# Patient Record
Sex: MALE | Race: WHITE | NOT HISPANIC OR LATINO | Employment: OTHER | URBAN - METROPOLITAN AREA
[De-identification: names, ages, dates, MRNs, and addresses within clinical notes are randomized per-mention and may not be internally consistent; named-entity substitution may affect disease eponyms.]

---

## 2021-06-30 ENCOUNTER — HOSPITAL ENCOUNTER (INPATIENT)
Facility: MEDICAL CENTER | Age: 66
LOS: 3 days | DRG: 552 | End: 2021-07-03
Attending: EMERGENCY MEDICINE | Admitting: STUDENT IN AN ORGANIZED HEALTH CARE EDUCATION/TRAINING PROGRAM
Payer: COMMERCIAL

## 2021-06-30 DIAGNOSIS — S00.31XA ABRASION OF NOSE, INITIAL ENCOUNTER: ICD-10-CM

## 2021-06-30 DIAGNOSIS — I95.1 ORTHOSTATIC SYNCOPE: ICD-10-CM

## 2021-06-30 DIAGNOSIS — S12.401A CLOSED NONDISPLACED FRACTURE OF FIFTH CERVICAL VERTEBRA, UNSPECIFIED FRACTURE MORPHOLOGY, INITIAL ENCOUNTER (HCC): ICD-10-CM

## 2021-06-30 LAB
ANION GAP SERPL CALC-SCNC: 11 MMOL/L (ref 7–16)
BUN SERPL-MCNC: 25 MG/DL (ref 8–22)
CALCIUM SERPL-MCNC: 9 MG/DL (ref 8.5–10.5)
CHLORIDE SERPL-SCNC: 106 MMOL/L (ref 96–112)
CO2 SERPL-SCNC: 24 MMOL/L (ref 20–33)
CREAT SERPL-MCNC: 0.76 MG/DL (ref 0.5–1.4)
GLUCOSE SERPL-MCNC: 148 MG/DL (ref 65–99)
POTASSIUM SERPL-SCNC: 4.4 MMOL/L (ref 3.6–5.5)
SODIUM SERPL-SCNC: 141 MMOL/L (ref 135–145)

## 2021-06-30 PROCEDURE — 86901 BLOOD TYPING SEROLOGIC RH(D): CPT

## 2021-06-30 PROCEDURE — 80048 BASIC METABOLIC PNL TOTAL CA: CPT

## 2021-06-30 PROCEDURE — 85025 COMPLETE CBC W/AUTO DIFF WBC: CPT

## 2021-06-30 PROCEDURE — 770006 HCHG ROOM/CARE - MED/SURG/GYN SEMI*

## 2021-06-30 PROCEDURE — 99285 EMERGENCY DEPT VISIT HI MDM: CPT

## 2021-06-30 PROCEDURE — 86900 BLOOD TYPING SEROLOGIC ABO: CPT

## 2021-06-30 PROCEDURE — 86850 RBC ANTIBODY SCREEN: CPT

## 2021-06-30 PROCEDURE — 99223 1ST HOSP IP/OBS HIGH 75: CPT | Performed by: STUDENT IN AN ORGANIZED HEALTH CARE EDUCATION/TRAINING PROGRAM

## 2021-06-30 RX ORDER — OMEPRAZOLE 10 MG/1
10 CAPSULE, DELAYED RELEASE ORAL DAILY
Status: DISCONTINUED | OUTPATIENT
Start: 2021-07-01 | End: 2021-07-01

## 2021-06-30 RX ORDER — MORPHINE SULFATE 4 MG/ML
2 INJECTION, SOLUTION INTRAMUSCULAR; INTRAVENOUS
Status: DISCONTINUED | OUTPATIENT
Start: 2021-06-30 | End: 2021-06-30

## 2021-06-30 RX ORDER — ONDANSETRON 4 MG/1
4 TABLET, ORALLY DISINTEGRATING ORAL EVERY 4 HOURS PRN
Status: DISCONTINUED | OUTPATIENT
Start: 2021-06-30 | End: 2021-07-03 | Stop reason: HOSPADM

## 2021-06-30 RX ORDER — ASPIRIN 81 MG/1
81 TABLET, CHEWABLE ORAL DAILY
Status: DISCONTINUED | OUTPATIENT
Start: 2021-07-01 | End: 2021-07-03 | Stop reason: HOSPADM

## 2021-06-30 RX ORDER — BACLOFEN 10 MG/1
10 TABLET ORAL 3 TIMES DAILY
Status: DISCONTINUED | OUTPATIENT
Start: 2021-07-01 | End: 2021-07-03 | Stop reason: HOSPADM

## 2021-06-30 RX ORDER — NADOLOL 20 MG/1
20 TABLET ORAL 2 TIMES DAILY
Status: DISCONTINUED | OUTPATIENT
Start: 2021-07-01 | End: 2021-07-03 | Stop reason: HOSPADM

## 2021-06-30 RX ORDER — PROMETHAZINE HYDROCHLORIDE 25 MG/1
12.5-25 TABLET ORAL EVERY 4 HOURS PRN
Status: DISCONTINUED | OUTPATIENT
Start: 2021-06-30 | End: 2021-07-03 | Stop reason: HOSPADM

## 2021-06-30 RX ORDER — ONDANSETRON 2 MG/ML
4 INJECTION INTRAMUSCULAR; INTRAVENOUS EVERY 4 HOURS PRN
Status: DISCONTINUED | OUTPATIENT
Start: 2021-06-30 | End: 2021-07-03 | Stop reason: HOSPADM

## 2021-06-30 RX ORDER — PROCHLORPERAZINE EDISYLATE 5 MG/ML
5-10 INJECTION INTRAMUSCULAR; INTRAVENOUS EVERY 4 HOURS PRN
Status: DISCONTINUED | OUTPATIENT
Start: 2021-06-30 | End: 2021-07-03 | Stop reason: HOSPADM

## 2021-06-30 RX ORDER — CLONIDINE HYDROCHLORIDE 0.1 MG/1
0.1 TABLET ORAL EVERY 6 HOURS PRN
Status: DISCONTINUED | OUTPATIENT
Start: 2021-06-30 | End: 2021-07-03 | Stop reason: HOSPADM

## 2021-06-30 RX ORDER — ENALAPRILAT 1.25 MG/ML
1.25 INJECTION INTRAVENOUS EVERY 6 HOURS PRN
Status: DISCONTINUED | OUTPATIENT
Start: 2021-06-30 | End: 2021-07-03 | Stop reason: HOSPADM

## 2021-06-30 RX ORDER — HEPARIN SODIUM 5000 [USP'U]/ML
5000 INJECTION, SOLUTION INTRAVENOUS; SUBCUTANEOUS EVERY 8 HOURS
Status: DISCONTINUED | OUTPATIENT
Start: 2021-07-01 | End: 2021-07-01

## 2021-06-30 RX ORDER — BISACODYL 10 MG
10 SUPPOSITORY, RECTAL RECTAL
Status: DISCONTINUED | OUTPATIENT
Start: 2021-06-30 | End: 2021-07-03 | Stop reason: HOSPADM

## 2021-06-30 RX ORDER — PROMETHAZINE HYDROCHLORIDE 25 MG/1
12.5-25 SUPPOSITORY RECTAL EVERY 4 HOURS PRN
Status: DISCONTINUED | OUTPATIENT
Start: 2021-06-30 | End: 2021-07-03 | Stop reason: HOSPADM

## 2021-06-30 RX ORDER — POLYETHYLENE GLYCOL 3350 17 G/17G
1 POWDER, FOR SOLUTION ORAL
Status: DISCONTINUED | OUTPATIENT
Start: 2021-06-30 | End: 2021-07-03 | Stop reason: HOSPADM

## 2021-06-30 RX ORDER — DEXTROSE MONOHYDRATE 25 G/50ML
50 INJECTION, SOLUTION INTRAVENOUS
Status: DISCONTINUED | OUTPATIENT
Start: 2021-06-30 | End: 2021-07-03 | Stop reason: HOSPADM

## 2021-06-30 RX ORDER — AMOXICILLIN 250 MG
2 CAPSULE ORAL 2 TIMES DAILY
Status: DISCONTINUED | OUTPATIENT
Start: 2021-07-01 | End: 2021-07-03 | Stop reason: HOSPADM

## 2021-06-30 RX ORDER — LABETALOL HYDROCHLORIDE 5 MG/ML
10 INJECTION, SOLUTION INTRAVENOUS EVERY 4 HOURS PRN
Status: DISCONTINUED | OUTPATIENT
Start: 2021-06-30 | End: 2021-07-03 | Stop reason: HOSPADM

## 2021-06-30 RX ORDER — LEVOTHYROXINE SODIUM 0.1 MG/1
100 TABLET ORAL DAILY
Status: DISCONTINUED | OUTPATIENT
Start: 2021-07-01 | End: 2021-07-03 | Stop reason: HOSPADM

## 2021-06-30 RX ORDER — LISINOPRIL 20 MG/1
40 TABLET ORAL DAILY
Status: DISCONTINUED | OUTPATIENT
Start: 2021-07-01 | End: 2021-07-03 | Stop reason: HOSPADM

## 2021-06-30 RX ORDER — ACETAMINOPHEN 325 MG/1
650 TABLET ORAL EVERY 6 HOURS PRN
Status: DISCONTINUED | OUTPATIENT
Start: 2021-06-30 | End: 2021-07-03 | Stop reason: HOSPADM

## 2021-06-30 RX ORDER — OXYCODONE HYDROCHLORIDE 5 MG/1
5 TABLET ORAL
Status: DISCONTINUED | OUTPATIENT
Start: 2021-06-30 | End: 2021-07-03 | Stop reason: HOSPADM

## 2021-06-30 RX ORDER — ATORVASTATIN CALCIUM 10 MG/1
10 TABLET, FILM COATED ORAL DAILY
Status: DISCONTINUED | OUTPATIENT
Start: 2021-07-01 | End: 2021-07-01

## 2021-06-30 RX ORDER — OXYCODONE HYDROCHLORIDE 5 MG/1
2.5 TABLET ORAL
Status: DISCONTINUED | OUTPATIENT
Start: 2021-06-30 | End: 2021-07-03 | Stop reason: HOSPADM

## 2021-07-01 ENCOUNTER — HOSPITAL ENCOUNTER (OUTPATIENT)
Dept: RADIOLOGY | Facility: MEDICAL CENTER | Age: 66
End: 2021-07-01
Payer: COMMERCIAL

## 2021-07-01 ENCOUNTER — APPOINTMENT (OUTPATIENT)
Dept: RADIOLOGY | Facility: MEDICAL CENTER | Age: 66
DRG: 552 | End: 2021-07-01
Attending: STUDENT IN AN ORGANIZED HEALTH CARE EDUCATION/TRAINING PROGRAM
Payer: COMMERCIAL

## 2021-07-01 PROBLEM — Z79.4 TYPE 2 DIABETES MELLITUS WITHOUT COMPLICATION, WITH LONG-TERM CURRENT USE OF INSULIN (HCC): Status: ACTIVE | Noted: 2021-07-01

## 2021-07-01 PROBLEM — S12.400A CLOSED FRACTURE OF FIFTH CERVICAL VERTEBRA (HCC): Status: ACTIVE | Noted: 2021-07-01

## 2021-07-01 PROBLEM — E03.8 OTHER SPECIFIED HYPOTHYROIDISM: Status: ACTIVE | Noted: 2021-07-01

## 2021-07-01 PROBLEM — G35 RELAPSING REMITTING MULTIPLE SCLEROSIS (HCC): Status: ACTIVE | Noted: 2021-07-01

## 2021-07-01 PROBLEM — E11.9 TYPE 2 DIABETES MELLITUS WITHOUT COMPLICATION, WITH LONG-TERM CURRENT USE OF INSULIN (HCC): Status: ACTIVE | Noted: 2021-07-01

## 2021-07-01 LAB
ABO + RH BLD: NORMAL
ABO GROUP BLD: NORMAL
BASOPHILS # BLD AUTO: 0.4 % (ref 0–1.8)
BASOPHILS # BLD: 0.03 K/UL (ref 0–0.12)
BLD GP AB SCN SERPL QL: NORMAL
EOSINOPHIL # BLD AUTO: 0.11 K/UL (ref 0–0.51)
EOSINOPHIL NFR BLD: 1.5 % (ref 0–6.9)
ERYTHROCYTE [DISTWIDTH] IN BLOOD BY AUTOMATED COUNT: 44.5 FL (ref 35.9–50)
EST. AVERAGE GLUCOSE BLD GHB EST-MCNC: 146 MG/DL
GLUCOSE BLD-MCNC: 122 MG/DL (ref 65–99)
GLUCOSE BLD-MCNC: 131 MG/DL (ref 65–99)
GLUCOSE BLD-MCNC: 132 MG/DL (ref 65–99)
GLUCOSE BLD-MCNC: 145 MG/DL (ref 65–99)
GLUCOSE BLD-MCNC: 149 MG/DL (ref 65–99)
HBA1C MFR BLD: 6.7 % (ref 4–5.6)
HCT VFR BLD AUTO: 43.7 % (ref 42–52)
HGB BLD-MCNC: 14.6 G/DL (ref 14–18)
IMM GRANULOCYTES # BLD AUTO: 0.02 K/UL (ref 0–0.11)
IMM GRANULOCYTES NFR BLD AUTO: 0.3 % (ref 0–0.9)
LYMPHOCYTES # BLD AUTO: 1.73 K/UL (ref 1–4.8)
LYMPHOCYTES NFR BLD: 23.3 % (ref 22–41)
MAGNESIUM SERPL-MCNC: 2.1 MG/DL (ref 1.5–2.5)
MCH RBC QN AUTO: 29.2 PG (ref 27–33)
MCHC RBC AUTO-ENTMCNC: 33.4 G/DL (ref 33.7–35.3)
MCV RBC AUTO: 87.4 FL (ref 81.4–97.8)
MONOCYTES # BLD AUTO: 0.7 K/UL (ref 0–0.85)
MONOCYTES NFR BLD AUTO: 9.4 % (ref 0–13.4)
NEUTROPHILS # BLD AUTO: 4.84 K/UL (ref 1.82–7.42)
NEUTROPHILS NFR BLD: 65.1 % (ref 44–72)
NRBC # BLD AUTO: 0 K/UL
NRBC BLD-RTO: 0 /100 WBC
PLATELET # BLD AUTO: 140 K/UL (ref 164–446)
PMV BLD AUTO: 11 FL (ref 9–12.9)
RBC # BLD AUTO: 5 M/UL (ref 4.7–6.1)
RH BLD: NORMAL
WBC # BLD AUTO: 7.4 K/UL (ref 4.8–10.8)

## 2021-07-01 PROCEDURE — 770006 HCHG ROOM/CARE - MED/SURG/GYN SEMI*

## 2021-07-01 PROCEDURE — 700102 HCHG RX REV CODE 250 W/ 637 OVERRIDE(OP): Performed by: STUDENT IN AN ORGANIZED HEALTH CARE EDUCATION/TRAINING PROGRAM

## 2021-07-01 PROCEDURE — 700117 HCHG RX CONTRAST REV CODE 255: Performed by: STUDENT IN AN ORGANIZED HEALTH CARE EDUCATION/TRAINING PROGRAM

## 2021-07-01 PROCEDURE — 83036 HEMOGLOBIN GLYCOSYLATED A1C: CPT

## 2021-07-01 PROCEDURE — 36415 COLL VENOUS BLD VENIPUNCTURE: CPT

## 2021-07-01 PROCEDURE — 99233 SBSQ HOSP IP/OBS HIGH 50: CPT | Performed by: HOSPITALIST

## 2021-07-01 PROCEDURE — A9576 INJ PROHANCE MULTIPACK: HCPCS | Performed by: STUDENT IN AN ORGANIZED HEALTH CARE EDUCATION/TRAINING PROGRAM

## 2021-07-01 PROCEDURE — 700111 HCHG RX REV CODE 636 W/ 250 OVERRIDE (IP): Performed by: STUDENT IN AN ORGANIZED HEALTH CARE EDUCATION/TRAINING PROGRAM

## 2021-07-01 PROCEDURE — 83735 ASSAY OF MAGNESIUM: CPT

## 2021-07-01 PROCEDURE — 72156 MRI NECK SPINE W/O & W/DYE: CPT

## 2021-07-01 PROCEDURE — 82962 GLUCOSE BLOOD TEST: CPT | Mod: 91

## 2021-07-01 PROCEDURE — A9270 NON-COVERED ITEM OR SERVICE: HCPCS | Performed by: STUDENT IN AN ORGANIZED HEALTH CARE EDUCATION/TRAINING PROGRAM

## 2021-07-01 RX ORDER — HEPARIN SODIUM 5000 [USP'U]/ML
5000 INJECTION, SOLUTION INTRAVENOUS; SUBCUTANEOUS EVERY 8 HOURS
Status: DISCONTINUED | OUTPATIENT
Start: 2021-07-01 | End: 2021-07-03 | Stop reason: HOSPADM

## 2021-07-01 RX ORDER — INSULIN LISPRO 100 [IU]/ML
4-18 INJECTION, SOLUTION INTRAVENOUS; SUBCUTANEOUS
COMMUNITY

## 2021-07-01 RX ADMIN — ASPIRIN 81 MG: 81 TABLET, CHEWABLE ORAL at 18:21

## 2021-07-01 RX ADMIN — HEPARIN SODIUM 5000 UNITS: 5000 INJECTION, SOLUTION INTRAVENOUS; SUBCUTANEOUS at 22:11

## 2021-07-01 RX ADMIN — BACLOFEN 10 MG: 10 TABLET ORAL at 06:37

## 2021-07-01 RX ADMIN — LEVOTHYROXINE SODIUM 100 MCG: 0.1 TABLET ORAL at 06:37

## 2021-07-01 RX ADMIN — OXYCODONE 5 MG: 5 TABLET ORAL at 01:23

## 2021-07-01 RX ADMIN — NADOLOL 20 MG: 20 TABLET ORAL at 06:37

## 2021-07-01 RX ADMIN — INSULIN GLARGINE 20 UNITS: 100 INJECTION, SOLUTION SUBCUTANEOUS at 22:13

## 2021-07-01 RX ADMIN — OXYCODONE 5 MG: 5 TABLET ORAL at 06:37

## 2021-07-01 RX ADMIN — LISINOPRIL 40 MG: 20 TABLET ORAL at 06:37

## 2021-07-01 RX ADMIN — GADOTERIDOL 19 ML: 279.3 INJECTION, SOLUTION INTRAVENOUS at 21:23

## 2021-07-01 RX ADMIN — BACLOFEN 10 MG: 10 TABLET ORAL at 18:21

## 2021-07-01 RX ADMIN — HEPARIN SODIUM 5000 UNITS: 5000 INJECTION, SOLUTION INTRAVENOUS; SUBCUTANEOUS at 14:52

## 2021-07-01 RX ADMIN — NADOLOL 20 MG: 20 TABLET ORAL at 18:21

## 2021-07-01 RX ADMIN — BACLOFEN 10 MG: 10 TABLET ORAL at 11:36

## 2021-07-01 RX ADMIN — DOCUSATE SODIUM 50 MG AND SENNOSIDES 8.6 MG 2 TABLET: 8.6; 5 TABLET, FILM COATED ORAL at 18:21

## 2021-07-01 RX ADMIN — DOCUSATE SODIUM 50 MG AND SENNOSIDES 8.6 MG 2 TABLET: 8.6; 5 TABLET, FILM COATED ORAL at 06:37

## 2021-07-01 ASSESSMENT — ENCOUNTER SYMPTOMS
SORE THROAT: 0
HEARTBURN: 0
WHEEZING: 0
LOSS OF CONSCIOUSNESS: 0
BACK PAIN: 0
DOUBLE VISION: 0
ABDOMINAL PAIN: 0
NECK PAIN: 1
SHORTNESS OF BREATH: 0
CONSTIPATION: 0
WEAKNESS: 0
VOMITING: 0
HEADACHES: 0
BLOOD IN STOOL: 0
DEPRESSION: 0
FOCAL WEAKNESS: 0
PALPITATIONS: 0
MYALGIAS: 0
FEVER: 0
CHILLS: 0
TINGLING: 1
INSOMNIA: 0
NAUSEA: 0
WEAKNESS: 1
DIZZINESS: 1
BLURRED VISION: 0
FALLS: 1
DIARRHEA: 0
BRUISES/BLEEDS EASILY: 0
COUGH: 0

## 2021-07-01 ASSESSMENT — PAIN DESCRIPTION - PAIN TYPE
TYPE: ACUTE PAIN

## 2021-07-01 NOTE — ASSESSMENT & PLAN NOTE
Continue insulin glargine 20 units at bedtime  Hemoglobin A1c ordered  Recommend daily aspirin 81 mg p.o. daily  Continue lisinopril 40 mg p.o. nightly  Low SSI  Follow-up outpatient PCP and endocrinology upon discharge

## 2021-07-01 NOTE — PROGRESS NOTES
[facility] Medicine Daily Progress Note    Date of Service  7/1/2021    Chief Complaint  66 y.o. male admitted 6/30/2021 with     Hospital Course  66 y.o. male who presented 6/30/2021 with complaints of ground-level fall earlier today while trying to put pants up in his backyard.  Patient states that he has a history of relapsing remitting multiple sclerosis and became slightly dizzy upon standing subsequently tripped and fell over face first and hit the ground.  Patient states that his arms were too weak to put out in front of him and obtained facial abrasions.  He complains of severe neck pain and states he felt completely paralyzed for the first 30 minutes however motor function had returned within the first 45 minutes after injury.  He was transferred to outside facility and subsequently transferred to [facility] for further escalation of care.  Dr. Abrams neurosurgeon has been consulted and informed patient had CT scan which revealed C5 fracture.  He is currently in neck brace and Dr. Abrams will evaluate patient in a.m and recommends hospitalist team for admission.  At time of evaluation, patient states that other than mild neck pain which he had received fentanyl for, he does not have any significant complaints.  He states he has been vaccinated x2 for COVID-19 and denies the following ROS: Anosmia, ageusia, diaphoresis, cough with sputum production, hemoptysis, nausea, vomiting, fever, chills, constipation, diarrhea, melena, hematochezia, bowel incontinence, urinary incontinence, paresthesias, tremors, seizure-like activity, loss of consciousness.     Vital signs admission were as follows: Pulse 65, respirations 11, blood pressure 172/77, SPO2 98% 2 L nasal cannula.     Outside labs were performed and CBC relatively unremarkable without any derangements.  BMP was performed and reveals mild hyperglycemia of 140 and otherwise unremarkable.  Per ERP, outside CT scan revealed C5 fracture.      Neurosurgery has been consulted  Patient managed with analgesics for pain control and aspen C-collar.   He agrees to full CODE STATUS at time of evaluation and is alert and oriented x4.  He is seen by neurosurgery on hospital day 1, they are recommending MRI of the C-spine to better evaluate the fracture seen on CT scan at outside hospital.        Interval Problem Update  No acute overnight events.  Pending MRI.  Patient denies any history of claustrophobia.  Has tolerated multiple MRIs well in the past as these are somewhat chronic for his monitoring of MS.  Reports that his discomfort to the bridge of his nose and neck are well controlled at this time on current medications.    Consultants/Specialty  Assistance of Dr. Abrams neurosurgery greatly appreciated.    Code Status  Full Code    Disposition  Pending at this juncture.    Review of Systems  All systems reviewed and negative except as noted per above.    Physical Exam  Temp:  [36.2 °C (97.1 °F)-36.7 °C (98 °F)] 36.7 °C (98 °F)  Pulse:  [58-65] 60  Resp:  [11-17] 17  BP: (142-172)/(66-80) 142/75  SpO2:  [94 %-98 %] 95 %    General: No acute distress  HEENT atraumatic, normocephalic, pupils equal round reactive to light  Neck: No JVD, Aspen c-collar intact.  Chest: Respirations are unlabored  Cardiac: Physiologic S1 and S2  Abdomen: Soft, nontender, nondistended  Extremities: Without clubbing, cyanosis or edema  Neuro: Cranial nerves II through XII are grossly intact.  Psych: No anxiety, judgement intact.        Current Facility-Administered Medications:   •  heparin injection 5,000 Units, 5,000 Units, Subcutaneous, Q8HRS, Nemesio Holcomb, D.O.  •  aspirin (ASA) chewable tab 81 mg, 81 mg, Oral, DAILY, Nemesio Holcomb D.O.  •  baclofen (LIORESAL) tablet 10 mg, 10 mg, Oral, TID, Nemesio Holcomb, D.O., 10 mg at 07/01/21 1136  •  insulin glargine (Semglee) injection, 20 Units, Subcutaneous, QHS, Nemesio Holcomb D.O.  •  levothyroxine (SYNTHROID) tablet  100 mcg, 100 mcg, Oral, DAILY, REX DaltonO., 100 mcg at 07/01/21 0637  •  lisinopril (PRINIVIL) tablet 40 mg, 40 mg, Oral, DAILY, ASHWIN Dalton.O., 40 mg at 07/01/21 0637  •  nadolol (CORGARD) tablet 20 mg, 20 mg, Oral, BID, ASHWIN Dalton.O., 20 mg at 07/01/21 0637  •  senna-docusate (PERICOLACE or SENOKOT S) 8.6-50 MG per tablet 2 tablet, 2 tablet, Oral, BID, 2 tablet at 07/01/21 0637 **AND** polyethylene glycol/lytes (MIRALAX) PACKET 1 Packet, 1 Packet, Oral, QDAY PRN **AND** magnesium hydroxide (MILK OF MAGNESIA) suspension 30 mL, 30 mL, Oral, QDAY PRN **AND** bisacodyl (DULCOLAX) suppository 10 mg, 10 mg, Rectal, QDAY PRN, Nemesio Holcomb D.O.  •  acetaminophen (Tylenol) tablet 650 mg, 650 mg, Oral, Q6HRS PRN, Nemesio Holcomb D.O.  •  Notify provider if pain remains uncontrolled, , , CONTINUOUS **AND** Use the Numeric Rating Scale (NRS), Gold-Baker Faces (WBF), or FLACC on regular floors and Critical-Care Pain Observation Tool (CPOT) on ICUs/Trauma to assess pain, , , CONTINUOUS **AND** Pulse Ox, , , CONTINUOUS **AND** Pharmacy Consult Request ...Pain Management Review 1 Each, 1 Each, Other, PHARMACY TO DOSE **AND** If patient difficult to arouse and/or has respiratory depression (respiratory rate of 10 or less), stop any opiates that are currently infusing and call a Rapid Response., , , CONTINUOUS, Nemesio Holcomb D.O.  •  oxyCODONE immediate-release (ROXICODONE) tablet 2.5 mg, 2.5 mg, Oral, Q3HRS PRN **OR** oxyCODONE immediate-release (ROXICODONE) tablet 5 mg, 5 mg, Oral, Q3HRS PRN, 5 mg at 07/01/21 0637 **OR** [DISCONTINUED] morphine (pf) 4 mg/mL injection 2 mg, 2 mg, Intravenous, Q3HRS PRN, Nemesio Holcomb, D.O.  •  cloNIDine (CATAPRES) tablet 0.1 mg, 0.1 mg, Oral, Q6HRS PRN, Nemesio Holcomb, D.O.  •  enalaprilat (VASOTEC) injection 1.25 mg, 1.25 mg, Intravenous, Q6HRS PRN, Nemesio Holcomb, D.O.  •  labetalol (NORMODYNE/TRANDATE) injection 10 mg, 10 mg, Intravenous,  Q4HRS PRN, Nemesio Holcomb D.O.  •  ondansetron (ZOFRAN) syringe/vial injection 4 mg, 4 mg, Intravenous, Q4HRS PRN, Neemsio Holcomb D.O.  •  ondansetron (ZOFRAN ODT) dispertab 4 mg, 4 mg, Oral, Q4HRS PRN, Nemesio Holcomb D.O.  •  promethazine (PHENERGAN) tablet 12.5-25 mg, 12.5-25 mg, Oral, Q4HRS PRN, Nemesio Holcomb D.O.  •  promethazine (PHENERGAN) suppository 12.5-25 mg, 12.5-25 mg, Rectal, Q4HRS PRN, Nemesio Holcomb, D.O.  •  prochlorperazine (COMPAZINE) injection 5-10 mg, 5-10 mg, Intravenous, Q4HRS PRN, Nemesio Holcomb D.O.  •  insulin regular (HumuLIN R,NovoLIN R) injection, 1-6 Units, Subcutaneous, 4X/DAY ACHS **AND** POC blood glucose manual result, , , Q AC AND BEDTIME(S) **AND** NOTIFY MD and PharmD, , , Once **AND** glucose 4 g chewable tablet 16 g, 16 g, Oral, Q15 MIN PRN **AND** dextrose 50% (D50W) injection 50 mL, 50 mL, Intravenous, Q15 MIN PRN, Nemesio Holcomb D.O.  •  fentaNYL (SUBLIMAZE) injection 25 mcg, 25 mcg, Intravenous, Q2HRS PRN, Nemesio Holcomb D.O.      Fluids    Intake/Output Summary (Last 24 hours) at 7/1/2021 1348  Last data filed at 7/1/2021 1310  Gross per 24 hour   Intake 600 ml   Output 450 ml   Net 150 ml       Laboratory  Recent Labs     06/30/21  2314   WBC 7.4   RBC 5.00   HEMOGLOBIN 14.6   HEMATOCRIT 43.7   MCV 87.4   MCH 29.2   MCHC 33.4*   RDW 44.5   PLATELETCT 140*   MPV 11.0     Recent Labs     06/30/21  2314   SODIUM 141   POTASSIUM 4.4   CHLORIDE 106   CO2 24   GLUCOSE 148*   BUN 25*   CREATININE 0.76   CALCIUM 9.0                   Imaging  OUTSIDE IMAGES-CT CERVICAL SPINE   Final Result   CT scan of the C-spine obtained from [facility] shows evidence of a subtle nondisplaced fracture inferior anterior tip of the C5 vertebral body best seen on image 26 series 7 and less well seen reconstructed coronal image 27.  Bones are osteopenic.  Hardware is intact from prior anterior discectomy and fusion C3-C4.  Loss of  normal lordotic curvature is noted at C6-C7.  Prominent anterior osteophytes are noted with flowing osteophytes.  C6-T1 with some slight vertebral body height loss.  C6 and C7 which could be from acute mild compression fractures or old injuries.  If this is important to delineate, MRI could make the distinction.  No spinal stenosis or neuroforaminal space narrowing is present.    Impression:  1. Subtle acute nondisplaced fracture anterior inferior tip C5 vertebral body.  2. Minimal vertebral body height loss at C6-C7 may well be chronic     OUTSIDE IMAGES-CT HEAD   Final Result      MR-CERVICAL SPINE-WITH & W/O    (Results Pending)        Assessment/Plan  * Closed fracture of fifth cervical vertebra (HCC)- (present on admission)  Assessment & Plan  Per ERP, outside CT scanning reveals C5 vertebral body fracture  Dr. Abrams neurosurgery has been consulted and the neurosurgical team would appreciate MRI of the C-spine to better guide therapy.  C-spine collar in place  Analgesics for pain control      Other specified hypothyroidism- (present on admission)  Assessment & Plan  Continue Synthroid 100 mcg p.o. daily    Type 2 diabetes mellitus without complication, with long-term current use of insulin (HCC)- (present on admission)  Assessment & Plan  Continue insulin glargine 20 units at bedtime  Hemoglobin A1c ordered  Recommend daily aspirin 81 mg p.o. daily  Continue lisinopril 40 mg p.o. nightly  Low SSI  Follow-up outpatient PCP and endocrinology upon discharge    Relapsing remitting multiple sclerosis (HCC)- (present on admission)  Assessment & Plan  Continue baclofen 10 mg p.o. twice daily  We will hold interferon beta at this time  Follow-up outpatient neurology and PCP upon discharge       VTE prophylaxis: Heparin's as ordered.

## 2021-07-01 NOTE — CARE PLAN
Problem: Knowledge Deficit - Standard  Goal: Patient and family/care givers will demonstrate understanding of plan of care, disease process/condition, diagnostic tests and medications  Outcome: Progressing     The patient is Stable - Low risk of patient condition declining or worsening

## 2021-07-01 NOTE — ED TRIAGE NOTES
Chief Complaint   Patient presents with   • Fall     mechanical fall after feeling dizzy. hit arms and face     Pt brought in by care flight following ground level mechanical fall. Pt reports he stood up too quickly, got dizzy, tripped and fell forward striking arms and then face. Pt reports losing movement and sensation in all extremities. Sensation returned to legs after 30 minutes, so pt was able to walk in house and call EMS at this time.   Brought to OSH where he received morphine without pain relief and was found to have C5 fracture.   Transferred by [facility] to this hospital. En route received 150mcg of fentanyl and was placed on 2L NC.   Pt placed in room and on monitor. Labs obtained. ERP at bedside   PMH of MS, DM, HTN, afib, hypothyroidism

## 2021-07-01 NOTE — PROGRESS NOTES
4 Eyes Skin Assessment Completed by NEY Navarro and NEY Shaa.    Head Scab, Bruising, Scratch, Redness and Scar  Ears WDL  Nose Scab  Mouth Discoloration  Neck WDL, c-collar on at all times   Breast/Chest WDL  Shoulder Blades WDL  Spine WDL  (R) Arm/Elbow/Hand WDL  (L) Arm/Elbow/Hand WDL  Abdomen WDL  Groin WDL  Scrotum/Coccyx/Buttocks WDL  (R) Leg Discoloration  (L) Leg Discoloration  (R) Heel/Foot/Toe WDL  (L) Heel/Foot/Toe WDL          Devices In Places SCD's      Interventions In Place Pillows    Possible Skin Injury No    Pictures Uploaded Into Epic N/A  Wound Consult Placed N/A  RN Wound Prevention Protocol Ordered No

## 2021-07-01 NOTE — HOSPITAL COURSE
66 y.o. male who presented 6/30/2021 with complaints of ground-level fall earlier today while trying to put pants up in his backyard.  Patient states that he has a history of relapsing remitting multiple sclerosis and became slightly dizzy upon standing subsequently tripped and fell over face first and hit the ground.  Patient states that his arms were too weak to put out in front of him and obtained facial abrasions.  He complains of severe neck pain and states he felt completely paralyzed for the first 30 minutes however motor function had returned within the first 45 minutes after injury.  He was transferred to outside facility and subsequently transferred to [facility] for further escalation of care.  Dr. Abrams neurosurgeon has been consulted and informed patient had CT scan which revealed C5 fracture.  He is currently in neck brace and Dr. Abrams will evaluate patient in a.m and recommends hospitalist team for admission.  At time of evaluation, patient states that other than mild neck pain which he had received fentanyl for, he does not have any significant complaints.  He states he has been vaccinated x2 for COVID-19 and denies the following ROS: Anosmia, ageusia, diaphoresis, cough with sputum production, hemoptysis, nausea, vomiting, fever, chills, constipation, diarrhea, melena, hematochezia, bowel incontinence, urinary incontinence, paresthesias, tremors, seizure-like activity, loss of consciousness.     Vital signs admission were as follows: Pulse 65, respirations 11, blood pressure 172/77, SPO2 98% 2 L nasal cannula.     Outside labs were performed and CBC relatively unremarkable without any derangements.  BMP was performed and reveals mild hyperglycemia of 140 and otherwise unremarkable.  Per ERP, outside CT scan revealed C5 fracture.     Neurosurgery has been consulted  Patient managed with analgesics for pain control and aspen C-collar.   He agrees to full CODE STATUS at time of  evaluation and is alert and oriented x4.  He is seen by neurosurgery on hospital day 1, they are recommending MRI of the C-spine to better evaluate the fracture seen on CT scan at outside hospital.

## 2021-07-01 NOTE — THERAPY
Physical Therapy Contact Note    PT consult received, active bedrest order; pending POC from neurosx consult; will follow up when established;     Darcie GUTIERREZ, PT,  [phone]

## 2021-07-01 NOTE — ED NOTES
Med Rec completed: per pt at bedside.      No ORAL antibiotics in last 30 days    Preferred Pharmacy: [facility] P: [phone]    Pt confirmed following allergies:  Allergies   Allergen Reactions   • Penicillins Rash   • Sulfamethoxazole Rash     Generalized rash       Pt's home medications:   Medication Sig   • insulin lispro (HUMALOG KWIKPEN) 100 UNIT/ML Solution Pen-injector injection PEN Inject 4-18 Units under the skin 3 times a day with meals.   • nadolol (CORGARD) 20 MG TABS Take 20 mg by mouth twice day.   • lisinopril (PRINIVIL) 40 MG tablet Take 40 mg by mouth every day.   • Insulin Glargine (LANTUS SC) Inject 20 Units under the skin at bedtime.   • levothyroxine (SYNTHROID) 100 MCG TABS Take 100 mcg by mouth every day. Indications: Underactive Thyroid   • Interferon Beta-1b (BETASERON SC) Inject 1 mL under the skin every 48 hours. Every other day administration    • baclofen (LIORESAL) 10 MG TABS Take 10 mg by mouth 3 times a day.   • aspirin 81 MG tablet Take 81 mg by mouth every day.     Removed medications:   Medication Removal Reason   • [DISCONTINUED] Atorvastatin Calcium (LIPITOR PO) Pt claims allergy    • [DISCONTINUED] Insulin Regular Human (HUMULIN R INJ) Pt claims use of humalog   • [DISCONTINUED] DILTIAZEM HCL Pt claims not taking    • [DISCONTINUED] omeprazole (PRILOSEC) 10 MG CPDR Pt claims not taking

## 2021-07-01 NOTE — H&P
Hospital Medicine History & Physical Note    Date of Service  6/30/2021    Primary Care Physician  Zhou Carter M.D.    Consultants  Neurosurgery    Code Status  Full Code    Chief Complaint  Chief Complaint   Patient presents with   • Fall     mechanical fall after feeling dizzy. hit arms and face       History of Presenting Illness  66 y.o. male who presented 6/30/2021 with complaints of ground-level fall earlier today while trying to put pants up in his backyard.  Patient states that he has a history of relapsing remitting multiple sclerosis and became slightly dizzy upon standing subsequently tripped and fell over face first and hit the ground.  Patient states that his arms were too weak to put out in front of him and obtained facial abrasions.  He complains of severe neck pain and states he felt completely paralyzed for the first 30 minutes however motor function had returned within the first 45 minutes after injury.  He was transferred to outside facility and subsequently transferred to [facility] for further escalation of care.  Dr. bArams neurosurgeon has been consulted and informed patient had CT scan which revealed C5 fracture.  He is currently in neck brace and Dr. Abrams will evaluate patient in a.m and recommends hospitalist team for admission.  At time of evaluation, patient states that other than mild neck pain which he had received fentanyl for, he does not have any significant complaints.  He states he has been vaccinated x2 for COVID-19 and denies the following ROS: Anosmia, ageusia, diaphoresis, cough with sputum production, hemoptysis, nausea, vomiting, fever, chills, constipation, diarrhea, melena, hematochezia, bowel incontinence, urinary incontinence, paresthesias, tremors, seizure-like activity, loss of consciousness.    Vital signs admission were as follows: Pulse 65, respirations 11, blood pressure 172/77, SPO2 98% 2 L nasal cannula.    Outside labs were performed and  CBC relatively unremarkable without any derangements.  BMP was performed and reveals mild hyperglycemia of 140 and otherwise unremarkable.  Per ERP, outside CT scan revealed C5 fracture.    Neurosurgery has been consulted and agrees to see patient in a.m.  Patient will be managed with analgesics for pain control and will remain in c-collar until evaluation with neurosurgery.  He agrees to full CODE STATUS at time of evaluation and is alert and oriented x4.    Review of Systems  Review of Systems   Constitutional: Negative for chills and fever.   HENT: Negative for congestion and sore throat.    Eyes: Negative for blurred vision and double vision.   Respiratory: Negative for cough, shortness of breath and wheezing.    Cardiovascular: Negative for chest pain and palpitations.   Gastrointestinal: Negative for abdominal pain, blood in stool, constipation, diarrhea, heartburn, melena, nausea and vomiting.   Genitourinary: Negative for dysuria and frequency.   Musculoskeletal: Positive for falls, joint pain and neck pain. Negative for back pain and myalgias.   Skin: Negative for itching and rash.   Neurological: Positive for dizziness and weakness (generalized and now resolved). Negative for focal weakness, loss of consciousness and headaches.   Endo/Heme/Allergies: Negative for environmental allergies. Does not bruise/bleed easily.   Psychiatric/Behavioral: Negative for depression. The patient does not have insomnia.        Past Medical History   has a past medical history of Arthritis, Atrial fibrillation (HCC), Diabetes, DJD (degenerative joint disease), Hypertension, Hypothyroid, and Multiple sclerosis (HCC) (2009).    Surgical History   has a past surgical history that includes other neurological surg.     Family History  family history is not on file.  Noncontributory to patient's presentation fall and C5 spinal fracture.    Social History   reports that he has never smoked. He has never used smokeless tobacco. He  reports that he does not drink alcohol and does not use drugs.    Allergies  Allergies   Allergen Reactions   • Penicillins Rash   • Sulfamethoxazole Rash     Generalized rash        Medications  Prior to Admission Medications   Prescriptions Last Dose Informant Patient Reported? Taking?   Atorvastatin Calcium (LIPITOR PO)  Patient Yes No   Sig: Take 10 mg by mouth every day.   DILTIAZEM HCL  Patient Yes No   Sig: by Does not apply route. Doctor stopped medication today    Insulin Glargine (LANTUS SC)  Patient Yes No   Sig: Inject 25 Units as instructed every bedtime.   Insulin Regular Human (HUMULIN R INJ)  Patient Yes No   Sig: by Injection route 3 times a day as needed. Sliding scale insulin    Interferon Beta-1b (BETASERON SC)  Patient Yes No   Sig: Inject 1 mL as instructed See Admin Instructions. Every other day administration    aspirin 81 MG tablet  Patient Yes No   Sig: Take 81 mg by mouth every day.   baclofen (LIORESAL) 10 MG TABS  Patient Yes No   Sig: Take 10 mg by mouth 2 times a day.   levothyroxine (SYNTHROID) 100 MCG TABS  Patient Yes No   Sig: Take 100 mcg by mouth every day. Indications: Underactive Thyroid   lisinopril (PRINIVIL) 20 MG TABS  Patient Yes No   Sig: Take 20 mg by mouth every day.   nadolol (CORGARD) 20 MG TABS   Yes No   Sig: Take 20 mg by mouth every day.   omeprazole (PRILOSEC) 10 MG CPDR  Patient Yes No   Sig: Take 10 mg by mouth every day.      Facility-Administered Medications: None       Physical Exam     Physical Exam  Vitals reviewed.   Constitutional:       General: He is not in acute distress.     Appearance: Normal appearance. He is normal weight. He is not ill-appearing, toxic-appearing or diaphoretic.   HENT:      Head: Normocephalic and atraumatic.      Comments: Facial abrasions appreciated     Mouth/Throat:      Mouth: Mucous membranes are moist.      Pharynx: Oropharynx is clear. No oropharyngeal exudate or posterior oropharyngeal erythema.   Eyes:      General: No  scleral icterus.     Extraocular Movements: Extraocular movements intact.      Conjunctiva/sclera: Conjunctivae normal.      Pupils: Pupils are equal, round, and reactive to light.   Neck:      Vascular: No carotid bruit.      Comments: C-collar appreciated  Cardiovascular:      Rate and Rhythm: Normal rate and regular rhythm.      Pulses: Normal pulses.      Heart sounds: Normal heart sounds. No murmur heard.   No friction rub. No gallop.    Pulmonary:      Effort: Pulmonary effort is normal.      Breath sounds: Normal breath sounds. No wheezing, rhonchi or rales.   Abdominal:      General: Bowel sounds are normal. There is no distension.      Palpations: Abdomen is soft. There is no mass.      Tenderness: There is no abdominal tenderness. There is no guarding or rebound.      Hernia: No hernia is present.   Musculoskeletal:         General: Normal range of motion.      Cervical back: Neck supple. No muscular tenderness.      Right lower leg: No edema.      Left lower leg: No edema.   Lymphadenopathy:      Cervical: No cervical adenopathy.   Skin:     General: Skin is warm and dry.      Capillary Refill: Capillary refill takes less than 2 seconds.      Coloration: Skin is not pale.      Findings: No erythema or rash.   Neurological:      General: No focal deficit present.      Mental Status: He is alert and oriented to person, place, and time. Mental status is at baseline.      Cranial Nerves: No cranial nerve deficit.      Sensory: No sensory deficit.      Motor: No weakness.      Comments: Moves all 4 extremities, no tremor like activity, alert and oriented x4, cranial nerves II through XII intact, muscle strength 5/5 upper and lower extremities bilaterally,  strength 5/5 bilaterally, sensation intact to pinpoint discrimination upper and lower extremities bilaterally, no acute focal deficits appreciated.   Psychiatric:         Mood and Affect: Mood normal.         Behavior: Behavior normal.         Thought  Content: Thought content normal.         Judgment: Judgment normal.         Laboratory:          No results for input(s): ALTSGPT, ASTSGOT, ALKPHOSPHAT, TBILIRUBIN, DBILIRUBIN, GAMMAGT, AMYLASE, LIPASE, ALB, PREALBUMIN, GLUCOSE in the last 72 hours.      No results for input(s): NTPROBNP in the last 72 hours.      No results for input(s): TROPONINT in the last 72 hours.    Imaging:  No orders to display         Assessment/Plan:  I anticipate this patient will require at least two midnights for appropriate medical management, necessitating inpatient admission.    * Closed fracture of fifth cervical vertebra (HCC)- (present on admission)  Assessment & Plan  Per ERP, outside CT scanning reveals C5 vertebral body fracture  Dr. Abrams neurosurgery has been consulted and agrees to evaluate patient in a.m.  C-spine collar in place  Analgesics for pain control      Other specified hypothyroidism- (present on admission)  Assessment & Plan  Continue Synthroid 100 mcg p.o. daily    Type 2 diabetes mellitus without complication, with long-term current use of insulin (HCC)- (present on admission)  Assessment & Plan  Continue insulin glargine 20 units at bedtime  Hemoglobin A1c ordered  Recommend daily aspirin 81 mg p.o. daily  Continue lisinopril 40 mg p.o. nightly  Low SSI  Follow-up outpatient PCP and endocrinology upon discharge    Relapsing remitting multiple sclerosis (HCC)- (present on admission)  Assessment & Plan  Continue baclofen 10 mg p.o. twice daily  We will hold interferon beta at this time  Follow-up outpatient neurology and PCP upon discharge

## 2021-07-01 NOTE — PROGRESS NOTES
"Received bedside report from Night RN. Pt awake and alert. Bed alarm in use. No complains of pain at this time. Discussed plan of care. MRI ordered for today. /75   Pulse 60   Temp 36.7 °C (98 °F) (Temporal)   Resp 17   Ht 1.854 m (6' 1\")   Wt 95.3 kg (210 lb)   SpO2 95%   BMI 27.71 kg/m²     "

## 2021-07-01 NOTE — THERAPY
Missed Therapy     Patient Name: Randall Worrell  Age:  66 y.o., Sex:  male  Medical Record #: [id number]  Today's Date: 7/1/2021    Discussed missed therapy with RN     OT consult rec'd. Pending neurosurgery consult and definitive POC. Will re-attempt as appropriate/able.

## 2021-07-01 NOTE — CARE PLAN
The patient is Stable - Low risk of patient condition declining or worsening         Progress made toward(s) clinical / shift goals:     Problem: Knowledge Deficit - Standard  Goal: Patient and family/care givers will demonstrate understanding of plan of care, disease process/condition, diagnostic tests and medications  Outcome: Progressing     Problem: Pain - Standard  Goal: Alleviation of pain or a reduction in pain to the patient’s comfort goal  Outcome: Progressing       Patient is not progressing towards the following goals:

## 2021-07-01 NOTE — ASSESSMENT & PLAN NOTE
Continue baclofen 10 mg p.o. twice daily  We will hold interferon beta at this time  Follow-up outpatient neurology and PCP upon discharge

## 2021-07-01 NOTE — ASSESSMENT & PLAN NOTE
Per ERP, outside CT scanning reveals C5 vertebral body fracture  Dr. Abrams neurosurgery has been consulted and the neurosurgical team would appreciate MRI of the C-spine to better guide therapy.  C-spine collar in place  Analgesics for pain control

## 2021-07-01 NOTE — ED PROVIDER NOTES
ED Provider Note    CHIEF COMPLAINT  Chief Complaint   Patient presents with   • Fall     mechanical fall after feeling dizzy. hit arms and face       HPI  Randall Worrell is a 66 y.o. male who presents to the emergency room as a transfer from [facility]. Past medical history significant for multiple sclerosis, diabetes and hypothyroidism in addition to known degenerative joint disease. Prior C-3 -4  fusion. Today with what sounds like a and orthostatic syncope after working on his fence. He stood up took a few steps and then tripped and fell over after getting lightheaded and dizzy. Trauma workup including CT head and neck. Head was negative however CT of the neck showed a new mild C5 fracture. Patient was placed in a C collar and then transferred here after further consultation with our renown neurosurgeon Dr. farmer which asked for transfer and further medical workup. This facility. Patient transferred to this facility via care flight. No changes in route. Patient continues to report increase in station discomfort with light touch to skin a bilateral lateral arms. Otherwise reports he is had improved movement of all extremities which were originally seemingly stunned and was unable to move these extremities and he had a prolonged downtime in his yard in the sun for approximately 30 to 45 minutes. Now back at his baseline otherwise.    REVIEW OF SYSTEMS  See HPI for further details. All other systems are negative.     PAST MEDICAL HISTORY   has a past medical history of Arthritis, Atrial fibrillation (HCC), Diabetes, DJD (degenerative joint disease), Hypertension, Hypothyroid, and Multiple sclerosis (HCC) (2009).    SOCIAL HISTORY  Social History     Tobacco Use   • Smoking status: Never Smoker   • Smokeless tobacco: Never Used   Vaping Use   • Vaping Use: Never used   Substance and Sexual Activity   • Alcohol use: No   • Drug use: No   • Sexual activity: Not on file       SURGICAL  "HISTORY   has a past surgical history that includes other neurological surg.    CURRENT MEDICATIONS  Home Medications     Reviewed by Fredy Crane (Pharmacy Tech) on 07/01/21 at 0023  Med List Status: Complete   Medication Last Dose Status   aspirin 81 MG tablet 6/30/2021 Active   baclofen (LIORESAL) 10 MG TABS 7/1/2021 Active   Insulin Glargine (LANTUS SC) 6/30/2021 Active   insulin lispro (HUMALOG KWIKPEN) 100 UNIT/ML Solution Pen-injector injection PEN 7/1/2021 Active   Interferon Beta-1b (BETASERON SC) 6/29/2021 Active   levothyroxine (SYNTHROID) 100 MCG TABS 7/1/2021 Active   lisinopril (PRINIVIL) 40 MG tablet 7/1/2021 Active   nadolol (CORGARD) 20 MG TABS 7/1/2021 Active                ALLERGIES  Allergies   Allergen Reactions   • Penicillins Rash   • Sulfamethoxazole Rash     Generalized rash        PHYSICAL EXAM  VITAL SIGNS: /76   Pulse 60   Temp 36.2 °C (97.1 °F) (Temporal)   Resp 16   Ht 1.854 m (6' 1\")   Wt 95.3 kg (210 lb)   SpO2 97%   BMI 27.71 kg/m²  @CHAYO[[id number]::@   Pulse ox interpretation: I interpret this pulse ox as normal.  Constitutional: Alert in no apparent distress.  HENT: nasal bridge abrasion, Bilateral external ears normal, Nose normal.   Eyes: Pupils are equal and reactive  Neck: c collar in place  Cardiovascular: Regular rate and rhythm, no murmurs.   Thorax & Lungs: Normal breath sounds, No respiratory distress, No wheezing, No chest tenderness.   Abdomen: Bowel sounds normal, Soft, No tenderness  Skin: Warm, Dry, No erythema, No rash.   Back: No bony tenderness  Extremities: Intact distal pulses, No edema, No tenderness, No cyanosis,  Negative Fan's sign.   Musculoskeletal: Good range of motion in all major joints. No tenderness to palpation or major deformities noted.   Neurologic: Alert , Normal motor function, Normal sensory function, No focal deficits noted.   Psychiatric: Affect normal, Judgment normal, Mood normal.       DIAGNOSTIC STUDIES / " PROCEDURES    LABS  Results for orders placed or performed during the hospital encounter of 06/30/21   CBC WITH DIFFERENTIAL   Result Value Ref Range    WBC 7.4 4.8 - 10.8 K/uL    RBC 5.00 4.70 - 6.10 M/uL    Hemoglobin 14.6 14.0 - 18.0 g/dL    Hematocrit 43.7 42.0 - 52.0 %    MCV 87.4 81.4 - 97.8 fL    MCH 29.2 27.0 - 33.0 pg    MCHC 33.4 (L) 33.7 - 35.3 g/dL    RDW 44.5 35.9 - 50.0 fL    Platelet Count 140 (L) 164 - 446 K/uL    MPV 11.0 9.0 - 12.9 fL    Neutrophils-Polys 65.10 44.00 - 72.00 %    Lymphocytes 23.30 22.00 - 41.00 %    Monocytes 9.40 0.00 - 13.40 %    Eosinophils 1.50 0.00 - 6.90 %    Basophils 0.40 0.00 - 1.80 %    Immature Granulocytes 0.30 0.00 - 0.90 %    Nucleated RBC 0.00 /100 WBC    Neutrophils (Absolute) 4.84 1.82 - 7.42 K/uL    Lymphs (Absolute) 1.73 1.00 - 4.80 K/uL    Monos (Absolute) 0.70 0.00 - 0.85 K/uL    Eos (Absolute) 0.11 0.00 - 0.51 K/uL    Baso (Absolute) 0.03 0.00 - 0.12 K/uL    Immature Granulocytes (abs) 0.02 0.00 - 0.11 K/uL    NRBC (Absolute) 0.00 K/uL   BASIC METABOLIC PANEL   Result Value Ref Range    Sodium 141 135 - 145 mmol/L    Potassium 4.4 3.6 - 5.5 mmol/L    Chloride 106 96 - 112 mmol/L    Co2 24 20 - 33 mmol/L    Glucose 148 (H) 65 - 99 mg/dL    Bun 25 (H) 8 - 22 mg/dL    Creatinine 0.76 0.50 - 1.40 mg/dL    Calcium 9.0 8.5 - 10.5 mg/dL    Anion Gap 11.0 7.0 - 16.0   COD (ADULT)   Result Value Ref Range    ABO Grouping Only O     Rh Grouping Only POS     Antibody Screen-Cod NEG    ESTIMATED GFR   Result Value Ref Range    GFR If African American >60 >60 mL/min/1.73 m 2    GFR If Non African American >60 >60 mL/min/1.73 m 2   POCT glucose device results   Result Value Ref Range    Glucose - Accu-Ck 132 (H) 65 - 99 mg/dL         RADIOLOGY  OUTSIDE IMAGES-CT CERVICAL SPINE   Final Result      OUTSIDE IMAGES-CT HEAD   Final Result              COURSE & MEDICAL DECISION MAKING  Pertinent Labs & Imaging studies reviewed. (See chart for details)  66-year-old presented to  the emergency department as a transfer from [facility] facility for acute C5 vertebral fracture after mechanical fall. History as above. Dr. farmer from neurosurgery accepting patient for transfer and asked the patient to be brought into the medicine and trauma services. Trauma services has respectively decline and asked the patient be primary brought into the medical service which is not been consulted and agreeable ongoing inpatient care. Patient has remained in the cervical collar. No acute changes upon arrival from transfer timing.    FINAL IMPRESSION  1. Orthostatic syncope    2. Abrasion of nose, initial encounter    3. Closed nondisplaced fracture of fifth cervical vertebra, unspecified fracture morphology, initial encounter ([facility])            Electronically signed by: Tomas Naqvi M.D., 6/30/2021 11:16 PM

## 2021-07-02 ENCOUNTER — PHARMACY VISIT (OUTPATIENT)
Dept: PHARMACY | Facility: MEDICAL CENTER | Age: 66
End: 2021-07-02
Payer: COMMERCIAL

## 2021-07-02 ENCOUNTER — PATIENT OUTREACH (OUTPATIENT)
Dept: HEALTH INFORMATION MANAGEMENT | Facility: OTHER | Age: 66
End: 2021-07-02

## 2021-07-02 LAB
GLUCOSE BLD-MCNC: 165 MG/DL (ref 65–99)
GLUCOSE BLD-MCNC: 167 MG/DL (ref 65–99)
GLUCOSE BLD-MCNC: 172 MG/DL (ref 65–99)
GLUCOSE BLD-MCNC: 174 MG/DL (ref 65–99)
GLUCOSE BLD-MCNC: 243 MG/DL (ref 65–99)

## 2021-07-02 PROCEDURE — 99233 SBSQ HOSP IP/OBS HIGH 50: CPT | Performed by: HOSPITALIST

## 2021-07-02 PROCEDURE — 97161 PT EVAL LOW COMPLEX 20 MIN: CPT

## 2021-07-02 PROCEDURE — 82962 GLUCOSE BLOOD TEST: CPT | Mod: 91

## 2021-07-02 PROCEDURE — A9270 NON-COVERED ITEM OR SERVICE: HCPCS | Performed by: STUDENT IN AN ORGANIZED HEALTH CARE EDUCATION/TRAINING PROGRAM

## 2021-07-02 PROCEDURE — 700111 HCHG RX REV CODE 636 W/ 250 OVERRIDE (IP): Performed by: STUDENT IN AN ORGANIZED HEALTH CARE EDUCATION/TRAINING PROGRAM

## 2021-07-02 PROCEDURE — RXMED WILLOW AMBULATORY MEDICATION CHARGE: Performed by: HOSPITALIST

## 2021-07-02 PROCEDURE — 770006 HCHG ROOM/CARE - MED/SURG/GYN SEMI*

## 2021-07-02 PROCEDURE — 97165 OT EVAL LOW COMPLEX 30 MIN: CPT

## 2021-07-02 PROCEDURE — 700102 HCHG RX REV CODE 250 W/ 637 OVERRIDE(OP): Performed by: STUDENT IN AN ORGANIZED HEALTH CARE EDUCATION/TRAINING PROGRAM

## 2021-07-02 RX ORDER — OXYCODONE AND ACETAMINOPHEN 10; 325 MG/1; MG/1
1 TABLET ORAL EVERY 4 HOURS PRN
Qty: 28 TABLET | Refills: 0 | Status: SHIPPED | OUTPATIENT
Start: 2021-07-02 | End: 2021-07-09

## 2021-07-02 RX ADMIN — INSULIN HUMAN 1 UNITS: 100 INJECTION, SOLUTION PARENTERAL at 12:22

## 2021-07-02 RX ADMIN — INSULIN HUMAN 1 UNITS: 100 INJECTION, SOLUTION PARENTERAL at 18:17

## 2021-07-02 RX ADMIN — ASPIRIN 81 MG: 81 TABLET, CHEWABLE ORAL at 18:13

## 2021-07-02 RX ADMIN — NADOLOL 20 MG: 20 TABLET ORAL at 18:13

## 2021-07-02 RX ADMIN — LISINOPRIL 40 MG: 20 TABLET ORAL at 06:30

## 2021-07-02 RX ADMIN — HEPARIN SODIUM 5000 UNITS: 5000 INJECTION, SOLUTION INTRAVENOUS; SUBCUTANEOUS at 12:33

## 2021-07-02 RX ADMIN — INSULIN GLARGINE 20 UNITS: 100 INJECTION, SOLUTION SUBCUTANEOUS at 21:30

## 2021-07-02 RX ADMIN — INSULIN HUMAN 2 UNITS: 100 INJECTION, SOLUTION PARENTERAL at 21:29

## 2021-07-02 RX ADMIN — INSULIN HUMAN 1 UNITS: 100 INJECTION, SOLUTION PARENTERAL at 06:41

## 2021-07-02 RX ADMIN — BACLOFEN 10 MG: 10 TABLET ORAL at 06:30

## 2021-07-02 RX ADMIN — DOCUSATE SODIUM 50 MG AND SENNOSIDES 8.6 MG 2 TABLET: 8.6; 5 TABLET, FILM COATED ORAL at 06:29

## 2021-07-02 RX ADMIN — BACLOFEN 10 MG: 10 TABLET ORAL at 18:13

## 2021-07-02 RX ADMIN — HEPARIN SODIUM 5000 UNITS: 5000 INJECTION, SOLUTION INTRAVENOUS; SUBCUTANEOUS at 21:36

## 2021-07-02 RX ADMIN — LEVOTHYROXINE SODIUM 100 MCG: 0.1 TABLET ORAL at 06:30

## 2021-07-02 RX ADMIN — BACLOFEN 10 MG: 10 TABLET ORAL at 12:24

## 2021-07-02 RX ADMIN — HEPARIN SODIUM 5000 UNITS: 5000 INJECTION, SOLUTION INTRAVENOUS; SUBCUTANEOUS at 06:29

## 2021-07-02 RX ADMIN — DOCUSATE SODIUM 50 MG AND SENNOSIDES 8.6 MG 2 TABLET: 8.6; 5 TABLET, FILM COATED ORAL at 18:12

## 2021-07-02 RX ADMIN — NADOLOL 20 MG: 20 TABLET ORAL at 06:30

## 2021-07-02 ASSESSMENT — COGNITIVE AND FUNCTIONAL STATUS - GENERAL
SUGGESTED CMS G CODE MODIFIER MOBILITY: CK
WALKING IN HOSPITAL ROOM: A LITTLE
TURNING FROM BACK TO SIDE WHILE IN FLAT BAD: A LITTLE
MOVING TO AND FROM BED TO CHAIR: A LITTLE
HELP NEEDED FOR BATHING: A LITTLE
DAILY ACTIVITIY SCORE: 23
STANDING UP FROM CHAIR USING ARMS: A LITTLE
CLIMB 3 TO 5 STEPS WITH RAILING: A LITTLE
SUGGESTED CMS G CODE MODIFIER DAILY ACTIVITY: CI
MOBILITY SCORE: 19

## 2021-07-02 ASSESSMENT — GAIT ASSESSMENTS
DISTANCE (FEET): 200
DISTANCE (FEET): 50
DEVIATION: BRADYKINETIC
GAIT LEVEL OF ASSIST: SUPERVISED

## 2021-07-02 ASSESSMENT — ACTIVITIES OF DAILY LIVING (ADL): TOILETING: INDEPENDENT

## 2021-07-02 ASSESSMENT — PAIN DESCRIPTION - PAIN TYPE: TYPE: ACUTE PAIN

## 2021-07-02 NOTE — CARE PLAN
The patient is Stable - Low risk of patient condition declining or worsening    Shift Goals  Clinical Goals: Bedrest  Patient Goals: rest    Progress made toward(s) clinical / shift goals:  Hourly rounding.  Non-skid socks. Bed locked & in low position. Personal belongings and call light  within reach. .

## 2021-07-02 NOTE — PROGRESS NOTES
Neurosurgery Progress Note    Subjective:  Patient doing well this morning  States he has some bilateral thumb pain  Denies bowel/baldder incontinence or saddles anesthesia     Exam:  VSS  A&Ox4, NAD  No hoarseness of voice.   Trachea midline, no difficulty swallowing - no coughing or choking while drinking water   No nuchal rigidity   NM: 5/5 deltoid, biceps, triceps, handgrip, intrinsics   5/5 BLE  Sensation intact and equal throughout all four extremities.   Pulmonary: non-labored breathing on room air, normal respiratory effort  No LE edema, erythema, cyanosis, clubbing  C-collar being worn appropriately      BP  Min: 129/73  Max: 138/74  Pulse  Av  Min: 60  Max: 69  Resp  Avg: 15.8  Min: 15  Max: 17  Temp  Av.1 °C (98.8 °F)  Min: 36.7 °C (98.1 °F)  Max: 37.4 °C (99.4 °F)  SpO2  Av %  Min: 94 %  Max: 99 %    No data recorded    Recent Labs     21  2314   WBC 7.4   RBC 5.00   HEMOGLOBIN 14.6   HEMATOCRIT 43.7   MCV 87.4   MCH 29.2   MCHC 33.4*   RDW 44.5   PLATELETCT 140*   MPV 11.0     Recent Labs     21  2314   SODIUM 141   POTASSIUM 4.4   CHLORIDE 106   CO2 24   GLUCOSE 148*   BUN 25*   CREATININE 0.76   CALCIUM 9.0               Intake/Output                       21 - 21 0659 21 - 21 0659      Total  Total                 Intake    P.O.  600  -- 600  --  -- --    P.O. 600 -- 600 -- -- --    Total Intake 600 -- 600 -- -- --       Output    Urine  900  250 1150  --  -- --    Number of Times Voided 1 x -- 1 x -- -- --    Urine Void (mL)  -- -- --    Stool  --  -- --  --  -- --    Number of Times Stooled -- -- -- 1 x -- 1 x    Total Output  -- -- --       Net I/O     -300 -250 -550 -- -- --            Intake/Output Summary (Last 24 hours) at 2021 0838  Last data filed at 2021 0300  Gross per 24 hour   Intake 480 ml   Output 1150 ml   Net -670 ml            • heparin  5,000 Units  Q8HRS   • aspirin  81 mg DAILY   • baclofen  10 mg TID   • insulin glargine  20 Units QHS   • levothyroxine  100 mcg DAILY   • lisinopril  40 mg DAILY   • nadolol  20 mg BID   • senna-docusate  2 tablet BID    And   • polyethylene glycol/lytes  1 Packet QDAY PRN    And   • magnesium hydroxide  30 mL QDAY PRN    And   • bisacodyl  10 mg QDAY PRN   • acetaminophen  650 mg Q6HRS PRN   • Pharmacy Consult Request  1 Each PHARMACY TO DOSE   • oxyCODONE immediate-release  2.5 mg Q3HRS PRN    Or   • oxyCODONE immediate-release  5 mg Q3HRS PRN   • cloNIDine  0.1 mg Q6HRS PRN   • enalaprilat  1.25 mg Q6HRS PRN   • labetalol  10 mg Q4HRS PRN   • ondansetron  4 mg Q4HRS PRN   • ondansetron  4 mg Q4HRS PRN   • promethazine  12.5-25 mg Q4HRS PRN   • promethazine  12.5-25 mg Q4HRS PRN   • prochlorperazine  5-10 mg Q4HRS PRN   • insulin regular  1-6 Units 4X/DAY ACHS    And   • glucose  16 g Q15 MIN PRN    And   • dextrose 50%  50 mL Q15 MIN PRN   • fentaNYL  25 mcg Q2HRS PRN       Assessment and Plan:  Hospital day # 2    Patient improving  Neurologically intact on exam  Patient appears to have significant central canal stenosis at C4-5  Awaiting MRI report  Recommend continue c-collar    Case d/w Dr. Abrams      Addendum:  MRI shows severe canal stenosis at C4-5 and a nondisplaced fracture of the anterior inferior corner of C5 vertebral body  MRI reviewed by Dr. Abrams, the patient will need C4-5 decompression    7/3/21 6 am    Patient seen and examined yesterday and risks, benefits, options of surgical intervention discussed.    I have recommended C4,5 ACDF + plate, C3,4 plate removal, and possible C4,5 laminectomy, instrumentation and fusion.    Patient is ok with scheduling surgery in the next couple of weeks.    I discussed with Dr. Rosas face to face that it is ok to send patient home and that my office would arrange surgery.    Appreciate hospitalist help.

## 2021-07-02 NOTE — DISCHARGE INSTRUCTIONS
Discharge Instructions    Discharged to home by car with relative  (wife). Discharged via wheelchair, hospital escort: Yes.  Special equipment needed: Not Applicable    Be sure to schedule a follow-up appointment with your primary care doctor or any specialists as instructed.     Discharge Plan:   Diet Plan: Discussed  Activity Level: Discussed  Confirmed Follow up Appointment: Patient to Call and Schedule Appointment  Confirmed Symptoms Management: Discussed  Medication Reconciliation Updated: Yes    I understand that a diet low in cholesterol, fat, and sodium is recommended for good health. Unless I have been given specific instructions below for another diet, I accept this instruction as my diet prescription.       Special Instructions:   Spinal Compression Fracture    A spinal compression fracture is a collapse of the bones that form the spine (vertebrae). With this type of fracture, the vertebrae become pushed (compressed) into a wedge shape. Most compression fractures happen in the middle or lower part of the spine.  What are the causes?  This condition may be caused by:  · Thinning and loss of density in the bones (osteoporosis). This is the most common cause.  · A fall.  · A car or motorcycle accident.  · Cancer.  · Trauma, such as a heavy, direct hit to the head or back.  What increases the risk?  You are more likely to develop this condition if:  · You are 60 years or older.  · You have osteoporosis.  · You have certain types of cancer, including:  ? Multiple myeloma.  ? Lymphoma.  ? Prostate cancer.  ? Lung cancer.  ? Breast cancer.  What are the signs or symptoms?  Symptoms of this condition include:  · Severe pain.  · Pain that gets worse over time.  · Pain that is worse when you stand, walk, sit, or bend.  · Sudden pain that is so bad that it is hard for you to move.  · Bending or humping of the spine.  · Gradual loss of height.  · Numbness, tingling, or weakness in the back and legs.  · Trouble  walking.  Your symptoms will depend on the cause of the fracture and how quickly it develops.  How is this diagnosed?  This condition may be diagnosed based on symptoms, medical history, and a physical exam. During the physical exam, your health care provider may tap along the length of your spine to check for tenderness. Tests may be done to confirm the diagnosis. They may include:  · A bone mineral density test to check for osteoporosis.  · Imaging tests, such as a spine X-ray, CT scan, or MRI.  How is this treated?  Treatment for this condition depends on the cause and severity of the condition. Some fractures may heal on their own with supportive care. Treatment may include:  · Pain medicine.  · Rest.  · A back brace.  · Physical therapy exercises.  · Medicine to strengthen bone.  · Calcium and vitamin D supplements.  Fractures that cause the back to become misshapen, cause nerve pain or weakness, or do not respond to other treatment may be treated with surgery. This may include:  · Vertebroplasty. Bone cement is injected into the collapsed vertebrae to stabilize them.  · Balloon kyphoplasty. The collapsed vertebrae are expanded with a balloon and then bone cement is injected into them.  · Spinal fusion. The collapsed vertebrae are connected (fused) to normal vertebrae.  Follow these instructions at home:  Medicines  · Take over-the-counter and prescription medicines only as told by your health care provider.  · Do not drive or operate heavy machinery while taking prescription pain medicine.  · If you are taking prescription pain medicine, take actions to prevent or treat constipation. Your health care provider may recommend that you:  ? Drink enough fluid to keep your urine pale yellow.  ? Eat foods that are high in fiber, such as fresh fruits and vegetables, whole grains, and beans.  ? Limit foods that are high in fat and processed sugars, such as fried or sweet foods.  ? Take an over-the-counter or  prescription medicine for constipation.  If you have a brace:  · Wear the brace as told by your health care provider. Remove it only as told by your health care provider.  · Loosen the brace if your fingers or toes tingle, become numb, or turn cold and blue.  · Keep the brace clean.  · If the brace is not waterproof:  ? Do not let it get wet.  ? Cover it with a watertight covering when you take a bath or a shower.  Managing pain, stiffness, and swelling    · If directed, apply ice to the injured area:  ? If you have a removable brace, remove it as told by your health care provider.  ? Put ice in a plastic bag.  ? Place a towel between your skin and the bag.  ? Leave the ice on for 30 minutes every two hours at first. Then apply the ice as needed.  Activity  · Rest as told by your health care provider.  ? Avoid sitting for a long time without moving. Get up to take short walks every 1-2 hours. This is important to improve blood flow and breathing. Ask for help if you feel weak or unsteady.  · Return to your normal activities as directed by your health care provider. Ask what activities are safe for you.  · Do exercises to improve motion and strength in your back (physical therapy), as recommended by your health care provider.  · Exercise regularly as directed by your health care provider.  General instructions    · Do not drink alcohol. Alcohol can interfere with your treatment.  · Do not use any products that contain nicotine or tobacco, such as cigarettes and e-cigarettes. These can delay bone healing. If you need help quitting, ask your health care provider.  · Keep all follow-up visits as told by your health care provider. This is important. It can help to prevent permanent injury, disability, and long-lasting (chronic) pain.  Contact a health care provider if:  · You have a fever.  · You develop a cough that makes your pain worse.  · Your pain medicine is not helping.  · Your pain does not get better over  time.  · You cannot return to your normal activities as planned or expected.  Get help right away if:  · Your pain is very bad and it suddenly gets worse.  · You are unable to move any body part (paralysis) that is below the level of your injury.  · You have numbness, tingling, or weakness in any body part that is below the level of your injury.  · You cannot control your bladder or bowels.  Summary  · A spinal compression fracture is a collapse of the bones that form the spine (vertebrae).  · With this type of fracture, the vertebrae become pushed (compressed) into a wedge shape.  · Your symptoms and treatment will depend on the cause and severity of the fracture and how quickly it develops.  · Some fractures may heal on their own with supportive care. Fractures that cause the back to become misshapen, cause nerve pain or weakness, or do not respond to other treatment may be treated with surgery.  This information is not intended to replace advice given to you by your health care provider. Make sure you discuss any questions you have with your health care provider.  Document Released: 12/18/2006 Document Revised: 02/13/2020 Document Reviewed: 01/29/2019  [employer] Patient Education © 2020 [employer]      · Is patient discharged on Warfarin / Coumadin?   No     Depression / Suicide Risk    As you are discharged from this [facility] facility, it is important to learn how to keep safe from harming yourself.    Recognize the warning signs:  · Abrupt changes in personality, positive or negative- including increase in energy   · Giving away possessions  · Change in eating patterns- significant weight changes-  positive or negative  · Change in sleeping patterns- unable to sleep or sleeping all the time   · Unwillingness or inability to communicate  · Depression  · Unusual sadness, discouragement and loneliness  · Talk of wanting to die  · Neglect of personal appearance   · Rebelliousness- reckless  behavior  · Withdrawal from people/activities they love  · Confusion- inability to concentrate     If you or a loved one observes any of these behaviors or has concerns about self-harm, here's what you can do:  · Talk about it- your feelings and reasons for harming yourself  · Remove any means that you might use to hurt yourself (examples: pills, rope, extension cords, firearm)  · Get professional help from the community (Mental Health, Substance Abuse, psychological counseling)  · Do not be alone:Call your Safe Contact- someone whom you trust who will be there for you.  · Call your local CRISIS HOTLINE 270-2534 or 576-322-9636  · Call your local Children's Mobile Crisis Response Team [place] (738) 017-8476 or www.AccountNow  · Call the toll free National Suicide Prevention Hotlines   · National Suicide Prevention Lifeline 303-012-XLNX (5571)  · [place] Line Network 800-SUICIDE (124-0451)          Oxycodone tablets or capsules  What is this medicine?  OXYCODONE (ox i KOE done) is a pain reliever. It is used to treat moderate to severe pain.  This medicine may be used for other purposes; ask your health care provider or pharmacist if you have questions.  COMMON BRAND NAME(S): Dazidox, Endocodone, Oxaydo, OXECTA, OxyIR, Percolone, Roxicodone, Roxybond  What should I tell my health care provider before I take this medicine?  They need to know if you have any of these conditions:  · Shamar's disease  · brain tumor  · head injury  · heart disease  · history of drug or alcohol abuse problem  · if you often drink alcohol  · kidney disease  · liver disease  · lung or breathing disease, like asthma  · mental illness  · pancreatic disease  · seizures  · thyroid disease  · an unusual or allergic reaction to oxycodone, codeine, hydrocodone, morphine, other medicines, foods, dyes, or preservatives  · pregnant or trying to get pregnant  · breast-feeding  How should I use this medicine?  Take this medicine by  mouth with a glass of water. Follow the directions on the prescription label. You can take it with or without food. If it upsets your stomach, take it with food. Take your medicine at regular intervals. Do not take it more often than directed. Do not stop taking except on your doctor's advice.  Some brands of this medicine, like Oxecta, have special instructions. Ask your doctor or pharmacist if these directions are for you: Do not cut, crush or chew this medicine. Swallow only one tablet at a time. Do not wet, soak, or lick the tablet before you take it.  A special MedGuide will be given to you by the pharmacist with each prescription and refill. Be sure to read this information carefully each time.  Talk to your pediatrician regarding the use of this medicine in children. Special care may be needed.  Overdosage: If you think you have taken too much of this medicine contact a poison control center or emergency room at once.  NOTE: This medicine is only for you. Do not share this medicine with others.  What if I miss a dose?  If you miss a dose, take it as soon as you can. If it is almost time for your next dose, take only that dose. Do not take double or extra doses.  What may interact with this medicine?  This medicine may interact with the following medications:  · alcohol  · antihistamines for allergy, cough and cold  · antiviral medicines for HIV or AIDS  · atropine  · certain antibiotics like clarithromycin, erythromycin, linezolid, rifampin  · certain medicines for anxiety or sleep  · certain medicines for bladder problems like oxybutynin, tolterodine  · certain medicines for depression like amitriptyline, fluoxetine, sertraline  · certain medicines for fungal infections like ketoconazole, itraconazole, voriconazole  · certain medicines for migraine headache like almotriptan, eletriptan, frovatriptan, naratriptan, rizatriptan, sumatriptan, zolmitriptan  · certain medicines for nausea or vomiting like  dolasetron, ondansetron, palonosetron  · certain medicines for Parkinson's disease like benztropine, trihexyphenidyl  · certain medicines for seizures like phenobarbital, phenytoin, primidone  · certain medicines for stomach problems like dicyclomine, hyoscyamine  · certain medicines for travel sickness like scopolamine  · diuretics  · general anesthetics like halothane, isoflurane, methoxyflurane, propofol  · ipratropium  · local anesthetics like lidocaine, pramoxine, tetracaine  · MAOIs like Carbex, Eldepryl, Marplan, Nardil, and Parnate  · medicines that relax muscles for surgery  · methylene blue  · nilotinib  · other narcotic medicines for pain or cough  · phenothiazines like chlorpromazine, mesoridazine, prochlorperazine, thioridazine  This list may not describe all possible interactions. Give your health care provider a list of all the medicines, herbs, non-prescription drugs, or dietary supplements you use. Also tell them if you smoke, drink alcohol, or use illegal drugs. Some items may interact with your medicine.  What should I watch for while using this medicine?  Tell your doctor or health care professional if your pain does not go away, if it gets worse, or if you have new or a different type of pain. You may develop tolerance to the medicine. Tolerance means that you will need a higher dose of the medicine for pain relief. Tolerance is normal and is expected if you take this medicine for a long time.  Do not suddenly stop taking your medicine because you may develop a severe reaction. Your body becomes used to the medicine. This does NOT mean you are addicted. Addiction is a behavior related to getting and using a drug for a non-medical reason. If you have pain, you have a medical reason to take pain medicine. Your doctor will tell you how much medicine to take. If your doctor wants you to stop the medicine, the dose will be slowly lowered over time to avoid any side effects.  There are different types  of narcotic medicines (opiates). If you take more than one type at the same time or if you are taking another medicine that also causes drowsiness, you may have more side effects. Give your health care provider a list of all medicines you use. Your doctor will tell you how much medicine to take. Do not take more medicine than directed. Call emergency for help if you have problems breathing or unusual sleepiness.  You may get drowsy or dizzy. Do not drive, use machinery, or do anything that needs mental alertness until you know how the medicine affects you. Do not stand or sit up quickly, especially if you are an older patient. This reduces the risk of dizzy or fainting spells. Alcohol may interfere with the effect of this medicine. Avoid alcoholic drinks.  This medicine will cause constipation. Try to have a bowel movement at least every 2 to 3 days. If you do not have a bowel movement for 3 days, call your doctor or health care professional.  Your mouth may get dry. Chewing sugarless gum or sucking hard candy, and drinking plenty of water may help. Contact your doctor if the problem does not go away or is severe.  What side effects may I notice from receiving this medicine?  Side effects that you should report to your doctor or health care professional as soon as possible:  · allergic reactions like skin rash, itching or hives, swelling of the face, lips, or tongue  · breathing problems  · confusion  · signs and symptoms of low blood pressure like dizziness; feeling faint or lightheaded, falls; unusually weak or tired  · trouble passing urine or change in the amount of urine  · trouble swallowing  Side effects that usually do not require medical attention (report to your doctor or health care professional if they continue or are bothersome):  · constipation  · dry mouth  · nausea, vomiting  · tiredness  This list may not describe all possible side effects. Call your doctor for medical advice about side effects. You  may report side effects to FDA at 0-656-IDK-6917.  Where should I keep my medicine?  Keep out of the reach of children. This medicine can be abused. Keep your medicine in a safe place to protect it from theft. Do not share this medicine with anyone. Selling or giving away this medicine is dangerous and against the law.  Store at room temperature between 15 and 30 degrees C (59 and 86 degrees F). Protect from light. Keep container tightly closed.  This medicine may cause harm and death if it is taken by other adults, children, or pets. Return medicine that has not been used to an official disposal site. Contact the [facility] at 1-849.688.5766 or your [facility] government to find a site. If you cannot return the medicine, flush it down the toilet. Do not use the medicine after the expiration date.  NOTE: This sheet is a summary. It may not cover all possible information. If you have questions about this medicine, talk to your doctor, pharmacist, or health care provider.  © 2020 [employer] (2018-04-24 16:13:10)

## 2021-07-02 NOTE — THERAPY
Missed Therapy     Patient Name: Randall Worrell  Age:  66 y.o., Sex:  male  Medical Record #: [id number]  Today's Date: 7/2/2021    Discussed missed therapy with RN    Pt with current bedrest order and pending MRI and NSY definitive POC. Will hold and re-attempt as appropriate/able.

## 2021-07-02 NOTE — THERAPY
Physical Therapy   Initial Evaluation     Patient Name: Randall Worrell  Age:  66 y.o., Sex:  male  Medical Record #: [id number]  Today's Date: 7/2/2021     Precautions: Fall Risk, Spinal / Back Precautions , Cervical Collar      Assessment  Patient is 66 y.o. male presenting acutely s/p GLF in yard resulting in quadriplegia x30', pt found with C5 fx. Per neurosx pt will require C4-C5 decompression which is scheduled 2 wks out. PMH includes MS and pt reports he has been in remission x11 years. Pt reports full resolution in symptoms with good strength and sensation. Educated pt on C-spine precautions. Pt able to amb without AD x200' and negotiate steps with UHR hold. No further acute PT needs at this time.    Plan    Recommend Physical Therapy for Evaluation only     DC Equipment Recommendations: None  Discharge Recommendations: Recommend outpatient physical therapy services to address higher level deficits (post-op once cleared by neurosx)     Objective     07/02/21 1631   Prior Living Situation   Prior Services Home-Independent   Housing / Facility 1 Story House   Steps Into Home 2   Steps In Home 0   Rail Left Rail  (Steps into Home)   Equipment Owned Single Point Cane   Lives with - Patient's Self Care Capacity Spouse   Comments Retired, wife able to assist prn   Prior Level of Functional Mobility   Bed Mobility Independent   Transfer Status Independent   Ambulation Independent   Distance Ambulation (Feet) (Community distances)   Assistive Devices Used None   Stairs Independent   Comments Reports hx of MS in remission x11 years, intermittent requires SPC with long walks/heat   History of Falls   Date of Last Fall (reason for admit)   Cognition    Comments Pleasant and cooperative, good safety awareness with mobility   Strength Lower Body   Lower Body Strength  WDL   Sensation Lower Body   Lower Extremity Sensation   WDL   Neurological Concerns   Comments given hx of MS   Balance Assessment   Sitting Balance  (Static) Fair +   Sitting Balance (Dynamic) Fair +   Standing Balance (Static) Fair   Standing Balance (Dynamic) Fair   Gait Analysis   Gait Level Of Assist Supervised   Assistive Device None   Distance (Feet) 200   Deviation Bradykinetic (cautious)   # of Stairs Climbed 4   Level of Assist with Stairs Supervised (UHR hold)   Weight Bearing Status No restrictions   Comments Educated on increased awareness on steps given inability to flex neck to visualize step   Bed Mobility    Supine to Sit Supervised (via log roll)   Sit to Supine (up in chair post assessment)   Functional Mobility   Bed, Chair, Wheelchair Transfer Supervised

## 2021-07-02 NOTE — DISCHARGE SUMMARY
Discharge Summary    CHIEF COMPLAINT ON ADMISSION  Chief Complaint   Patient presents with   • Fall     mechanical fall after feeling dizzy. hit arms and face       Reason for Admission  Cervical fracture, C5 vertebral neelam*     Admission Date  6/30/2021    CODE STATUS  Full Code    HPI & HOSPITAL COURSE  66 y.o. male who presented 6/30/2021 with complaints of ground-level fall earlier today while trying to put pants up in his backyard.  Patient states that he has a history of relapsing remitting multiple sclerosis and became slightly dizzy upon standing subsequently tripped and fell over face first and hit the ground.  Patient states that his arms were too weak to put out in front of him and obtained facial abrasions.  He complains of severe neck pain and states he felt completely paralyzed for the first 30 minutes however motor function had returned within the first 45 minutes after injury.  He was transferred to outside facility and subsequently transferred to [facility] for further escalation of care.  Dr. Abrams neurosurgeon has been consulted and informed patient had CT scan which revealed C5 fracture.  He is currently in neck brace and Dr. Abrams will evaluate patient in a.m and recommends hospitalist team for admission.  At time of evaluation, patient states that other than mild neck pain which he had received fentanyl for, he does not have any significant complaints.  He states he has been vaccinated x2 for COVID-19 and denies the following ROS: Anosmia, ageusia, diaphoresis, cough with sputum production, hemoptysis, nausea, vomiting, fever, chills, constipation, diarrhea, melena, hematochezia, bowel incontinence, urinary incontinence, paresthesias, tremors, seizure-like activity, loss of consciousness.     Vital signs admission were as follows: Pulse 65, respirations 11, blood pressure 172/77, SPO2 98% 2 L nasal cannula.     Outside labs were performed and CBC relatively unremarkable without  any derangements.  BMP was performed and reveals mild hyperglycemia of 140 and otherwise unremarkable.  Per ERP, outside CT scan revealed C5 fracture.     Neurosurgery has been consulted  Patient managed with analgesics for pain control and aspen C-collar.   He agrees to full CODE STATUS at time of evaluation and is alert and oriented x4.  He is seen by neurosurgery on hospital day 1, they are recommending MRI of the C-spine to better evaluate the fracture seen on CT scan at outside hospital.    MRI of the C-spine was obtained, results per below.  Results reviewed with neurosurgery.  They feel patient will need a laminectomy to relieve the stenosis seen, they are requesting that she be discharged with oral pain meds today as they feel a 2-week interval will be optimal to reduce inflammation and optimize surgical outcomes.  They will be reaching out to the patient to facilitate outpatient follow-up.  Patient is provided with Aspen c-collar and c-collar precautions.    Therefore, he is discharged in good and stable condition to home with close outpatient follow-up.    The patient met 2-midnight criteria for an inpatient stay at the time of discharge.    Discharge Date  07/02/21      FOLLOW UP ITEMS POST DISCHARGE  None      DISCHARGE DIAGNOSES  Principal Problem:    Closed fracture of fifth cervical vertebra (HCC) POA: Yes  Active Problems:    Relapsing remitting multiple sclerosis (HCC) POA: Yes    Type 2 diabetes mellitus without complication, with long-term current use of insulin (HCC) POA: Yes    Other specified hypothyroidism POA: Yes  Resolved Problems:    * No resolved hospital problems. *      FOLLOW UP  No future appointments.  Trevin Abrams M.D.  [id number]  [facility] [id number] [phone]    In 2 weeks  To arranged decompressive surgery on C4-C5 stenosis.      Zhou Carter M.D.  [address] [place] [address]  [phone]          Zhou Carter M.D.  [address] [place]  [place] [address]  [phone]      Please call and schedule an appt with your primary care provider as needed. Thank you      MEDICATIONS ON DISCHARGE     Medication List      START taking these medications      Instructions   oxyCODONE-acetaminophen  MG Tabs  Commonly known as: PERCOCET-10   Doctor's comments: Dx Closed fracture of fifth cervical vertebra. Code: S12.400A  Take 1 tablet by mouth every four hours as needed for Severe Pain for up to 7 days.  Dose: 1 tablet        CONTINUE taking these medications      Instructions   aspirin 81 MG tablet   Take 81 mg by mouth every day.  Dose: 81 mg     baclofen 10 MG Tabs  Commonly known as: LIORESAL   Take 10 mg by mouth 3 times a day.  Dose: 10 mg     BETASERON SC   Inject 1 mL under the skin every 48 hours. Every other day administration  Dose: 1 mL     HumaLOG KwikPen 100 UNIT/ML Sopn injection PEN  Generic drug: insulin lispro   Inject 4-18 Units under the skin 3 times a day with meals.  Dose: 4-18 Units     LANTUS SC   Inject 20 Units under the skin at bedtime.  Dose: 20 Units     lisinopril 40 MG tablet  Commonly known as: PRINIVIL   Take 40 mg by mouth every day.  Dose: 40 mg     nadolol 20 MG Tabs  Commonly known as: CORGARD   Take 20 mg by mouth 2 times a day.  Dose: 20 mg     Synthroid 100 MCG Tabs  Generic drug: levothyroxine   Take 100 mcg by mouth every day. Indications: Underactive Thyroid  Dose: 100 mcg            Allergies  Allergies   Allergen Reactions   • Penicillins Rash   • Sulfamethoxazole Rash     Generalized rash        DIET  Orders Placed This Encounter   Procedures   • Diet Order Diet: Cardiac; Second Modifier: (optional): Consistent CHO (Diabetic)     Standing Status:   Standing     Number of Occurrences:   1     Order Specific Question:   Diet:     Answer:   Cardiac [6]     Order Specific Question:   Second Modifier: (optional)     Answer:   Consistent CHO (Diabetic) [4]       ACTIVITY  As tolerated.  Weight bearing as  tolerated    CONSULTATIONS  Neurosurgery    RADIOLOGY  MR-CERVICAL SPINE-WITH & W/O   Final Result      1.  Nondisplaced fracture anterior-inferior C5 vertebral body.   2.  Edema in the C4-C5 interspinous ligament and the posterior paraspinous soft tissues could be related to injury.   3.  C3-C4 ACDF with bony interbody fusion.   4.  C4-C5 disc osteophyte and ligamentum flavum thickening result in severe spinal stenosis and mild cord compression. No definite abnormal cord signal.   5.  Mild degenerative changes elsewhere as detailed.      OUTSIDE IMAGES-CT CERVICAL SPINE   Final Result      OUTSIDE IMAGES-CT HEAD   Final Result            LABORATORY  Lab Results   Component Value Date    SODIUM 141 06/30/2021    POTASSIUM 4.4 06/30/2021    CHLORIDE 106 06/30/2021    CO2 24 06/30/2021    GLUCOSE 148 (H) 06/30/2021    BUN 25 (H) 06/30/2021    CREATININE 0.76 06/30/2021        Lab Results   Component Value Date    WBC 7.4 06/30/2021    HEMOGLOBIN 14.6 06/30/2021    HEMATOCRIT 43.7 06/30/2021    PLATELETCT 140 (L) 06/30/2021        Total time of the discharge process exceeds 32 minutes.    Addendum 7/3/2021:  Following placement of discharge order patient had some minor presyncopal sensation, orthostatics were checked and positive.  He will be monitored overnight.  Home dose lisinopril will be held, patient was actually discharged on 7/3/2021.  Reported improvement in his lightheadedness.  He was instructed to hold his home dose lisinopril until restarted by his PCP with whom he has an appointment later this month.

## 2021-07-02 NOTE — DISCHARGE PLANNING
Meds-to-Beds: Discharge prescription orders listed below delivered to patient's bedside. RN Justin notified. Patient counseled. Patient elected to have co-payment billed to patient account.      Zeke Worrell Donavan   Home Medication Instructions JOHANNE:[id number]    Printed on:07/02/21 6090   Medication Information                      oxyCODONE-acetaminophen (PERCOCET-10)  MG Tab  Take 1 tablet by mouth every four hours as needed for Severe Pain for up to 7 days.                 Nelly Freire, PharmD

## 2021-07-02 NOTE — CARE PLAN
The patient is Stable - Low risk of patient condition declining or worsening    Shift Goals  Clinical Goals: MRI; bedrest  Patient Goals: rest    Progress made toward(s) clinical / shift goals:  MRI completed, results pending. Patient sleeping.    Patient is not progressing towards the following goals:

## 2021-07-03 VITALS
RESPIRATION RATE: 16 BRPM | TEMPERATURE: 98 F | BODY MASS INDEX: 27.83 KG/M2 | DIASTOLIC BLOOD PRESSURE: 67 MMHG | WEIGHT: 210 LBS | OXYGEN SATURATION: 97 % | HEIGHT: 73 IN | SYSTOLIC BLOOD PRESSURE: 104 MMHG | HEART RATE: 59 BPM

## 2021-07-03 LAB
GLUCOSE BLD-MCNC: 120 MG/DL (ref 65–99)
GLUCOSE BLD-MCNC: 139 MG/DL (ref 65–99)

## 2021-07-03 PROCEDURE — 99239 HOSP IP/OBS DSCHRG MGMT >30: CPT | Performed by: HOSPITALIST

## 2021-07-03 PROCEDURE — 82962 GLUCOSE BLOOD TEST: CPT | Mod: 91

## 2021-07-03 PROCEDURE — 700102 HCHG RX REV CODE 250 W/ 637 OVERRIDE(OP): Performed by: STUDENT IN AN ORGANIZED HEALTH CARE EDUCATION/TRAINING PROGRAM

## 2021-07-03 PROCEDURE — 700111 HCHG RX REV CODE 636 W/ 250 OVERRIDE (IP): Performed by: STUDENT IN AN ORGANIZED HEALTH CARE EDUCATION/TRAINING PROGRAM

## 2021-07-03 PROCEDURE — A9270 NON-COVERED ITEM OR SERVICE: HCPCS | Performed by: STUDENT IN AN ORGANIZED HEALTH CARE EDUCATION/TRAINING PROGRAM

## 2021-07-03 RX ADMIN — HEPARIN SODIUM 5000 UNITS: 5000 INJECTION, SOLUTION INTRAVENOUS; SUBCUTANEOUS at 12:42

## 2021-07-03 RX ADMIN — HEPARIN SODIUM 5000 UNITS: 5000 INJECTION, SOLUTION INTRAVENOUS; SUBCUTANEOUS at 05:49

## 2021-07-03 RX ADMIN — LEVOTHYROXINE SODIUM 100 MCG: 0.1 TABLET ORAL at 05:50

## 2021-07-03 RX ADMIN — BACLOFEN 10 MG: 10 TABLET ORAL at 12:40

## 2021-07-03 RX ADMIN — NADOLOL 20 MG: 20 TABLET ORAL at 05:50

## 2021-07-03 RX ADMIN — LISINOPRIL 40 MG: 20 TABLET ORAL at 05:50

## 2021-07-03 RX ADMIN — BACLOFEN 10 MG: 10 TABLET ORAL at 05:50

## 2021-07-03 RX ADMIN — DOCUSATE SODIUM 50 MG AND SENNOSIDES 8.6 MG 2 TABLET: 8.6; 5 TABLET, FILM COATED ORAL at 05:49

## 2021-07-03 ASSESSMENT — PAIN DESCRIPTION - PAIN TYPE: TYPE: ACUTE PAIN

## 2021-07-03 NOTE — THERAPY
Occupational Therapy   Initial Evaluation     Patient Name: Randall Worrell  Age:  66 y.o., Sex:  male  Medical Record #: [id number]  Today's Date: 7/2/2021     Precautions  Precautions: Fall Risk, Spinal / Back Precautions , Cervical Collar      Assessment  Patient is 66 y.o. male  presenting acutely s/p GLF in yard resulting in quadriplegia x30', pt found with C5 fx. Per neurosx pt will require C4-C5 decompression which is scheduled 2 wks out. PMH includes MS and pt reports he has been in remission x11 years. Pt presented to OT eval with decreased activity tolerance. Pt completed LB dressing, toilet txf, and standing grooming/hygiene with spv. Despite SOB, pt is compensating well and is functioning at baseline with ADLs, ADL txfs, and functional mobility. Pt educated on cervical spine precautions, compensatory strategies for ADLs, and cervical collar management. Pt was receptive to education and demonstrated knowledge during ADLs. Patient will not be actively followed for occupational therapy services at this time, however may be seen if requested by physician for one more visit within 30 days to address any discharge or equipment needs.    Plan    Recommend Occupational Therapy for Evaluation only.     DC Equipment Recommendations: (P) Unable to determine at this time  Discharge Recommendations: (P) Anticipate that the patient will have no further occupational therapy needs after discharge from the hospital     Subjective    Pt was alert, cooperative, and very pleasant.      Objective       07/02/21 1546   Prior Living Situation   Prior Services Home-Independent   Housing / Facility 1 Story House   Steps Into Home 2   Steps In Home 0   Bathroom Set up Walk In Shower;Grab Bars   Equipment Owned Single Point Cane;Grab Bar(s) In Tub / Shower   Lives with - Patient's Self Care Capacity Spouse  (wife)   Comments Pt lives with his wife. Wife works full time however is able to assist if needed. Pt does not use SPC  at baseline.   Prior Level of ADL Function   Self Feeding Independent   Grooming / Hygiene Independent   Bathing Independent   Dressing Independent   Toileting Independent   Prior Level of IADL Function   Medication Management Independent   Laundry Independent   Kitchen Mobility Independent   Finances Independent   Home Management Independent   Shopping Independent   Prior Level Of Mobility Independent Without Device in Community;Independent Without Device in Home   Driving / Transportation Driving Independent   Occupation (Pre-Hospital Vocational) Retired Due To Age  ( )   Leisure Interests Other (Comments)  (Enjoys yard work )   Pain 0 - 10 Group   Therapist Pain Assessment 0;Nurse Notified;Post Activity Pain Same as Prior to Activity;During Activity  (o c/o pain)   Cognition    Cognition / Consciousness WDL   Level of Consciousness Alert   Comments Pt was alert, cooperative, and very pleasant    Passive ROM Upper Body   Passive ROM Upper Body WDL   Active ROM Upper Body   Active ROM Upper Body  WDL   Strength Upper Body   Upper Body Strength  WDL   Sensation Upper Body   Upper Extremity Sensation  WDL   Upper Body Muscle Tone   Upper Body Muscle Tone  WDL   Neurological Concerns   Neurological Concerns No   Coordination Upper Body   Coordination WDL   Balance Assessment   Sitting Balance (Static) Fair +   Sitting Balance (Dynamic) Fair +   Standing Balance (Static) Fair   Standing Balance (Dynamic) Fair   Weight Shift Sitting Fair   Weight Shift Standing Fair   Comments no AD, no LOB   Bed Mobility    Supine to Sit Supervised   Sit to Supine Supervised   Scooting Supervised   Rolling Supervised   ADL Assessment   Grooming Supervision;Standing  (brushed teeth and washed hands )   Lower Body Dressing Supervision  (socks)   Toileting Supervision  (pericare )   How much help from another person does the patient currently need...   6 Clicks Daily Activity Score 23   Functional Mobility   Sit to Stand  Supervised   Bed, Chair, Wheelchair Transfer Supervised   Toilet Transfers Supervised   Transfer Method Stand Step   Mobility in room, hallway, and bathroom   Distance (Feet) 50   # of Times Distance was Traveled 2   Comments no AD, no LOB   Activity Tolerance   Sitting in Chair 5 mins  (toilet )   Sitting Edge of Bed 5-8 mins   Standing 5-8 mins   Comments Pt had SOB   Patient / Family Goals   Patient / Family Goal #1 To go home

## 2021-07-03 NOTE — PROGRESS NOTES
[facility] Medicine Daily Progress Note    Date of Service  7/2/21    Chief Complaint  66 y.o. male admitted 6/30/2021 with     Hospital Course  66 y.o. male who presented 6/30/2021 with complaints of ground-level fall earlier today while trying to put pants up in his backyard.  Patient states that he has a history of relapsing remitting multiple sclerosis and became slightly dizzy upon standing subsequently tripped and fell over face first and hit the ground.  Patient states that his arms were too weak to put out in front of him and obtained facial abrasions.  He complains of severe neck pain and states he felt completely paralyzed for the first 30 minutes however motor function had returned within the first 45 minutes after injury.  He was transferred to outside facility and subsequently transferred to [facility] for further escalation of care.  Dr. Abrams neurosurgeon has been consulted and informed patient had CT scan which revealed C5 fracture.  He is currently in neck brace and Dr. Abrams will evaluate patient in a.m and recommends hospitalist team for admission.  At time of evaluation, patient states that other than mild neck pain which he had received fentanyl for, he does not have any significant complaints.  He states he has been vaccinated x2 for COVID-19 and denies the following ROS: Anosmia, ageusia, diaphoresis, cough with sputum production, hemoptysis, nausea, vomiting, fever, chills, constipation, diarrhea, melena, hematochezia, bowel incontinence, urinary incontinence, paresthesias, tremors, seizure-like activity, loss of consciousness.     Vital signs admission were as follows: Pulse 65, respirations 11, blood pressure 172/77, SPO2 98% 2 L nasal cannula.     Outside labs were performed and CBC relatively unremarkable without any derangements.  BMP was performed and reveals mild hyperglycemia of 140 and otherwise unremarkable.  Per ERP, outside CT scan revealed C5 fracture.      Neurosurgery has been consulted  Patient managed with analgesics for pain control and aspen C-collar.   He agrees to full CODE STATUS at time of evaluation and is alert and oriented x4.  He is seen by neurosurgery on hospital day 1, they are recommending MRI of the C-spine to better evaluate the fracture seen on CT scan at outside hospital.        Interval Problem Update  No acute overnight events.  Pending MRI.  Patient denies any history of claustrophobia.  Has tolerated multiple MRIs well in the past as these are somewhat chronic for his monitoring of MS.  Reports that his discomfort to the bridge of his nose and neck are well controlled at this time on current medications.    Consultants/Specialty  Assistance of Dr. Abrams neurosurgery greatly appreciated.    Code Status  Full Code    Disposition  Cleared to return home today 7/2/2021 by the neurosurgical team.  Assistance greatly appreciated, however patient has positive orthostatics will be monitored overnight.  Possible discharge in the morning.    Review of Systems  All systems reviewed and negative except as noted per above.    Physical Exam  Temp:  [36.4 °C (97.6 °F)-36.9 °C (98.5 °F)] 36.7 °C (98 °F)  Pulse:  [50-67] 59  Resp:  [16-18] 16  BP: ()/(48-74) 104/67  SpO2:  [93 %-97 %] 97 %    General: No acute distress  HEENT atraumatic, normocephalic, pupils equal round reactive to light  Neck: No JVD, Aspen c-collar intact.  Chest: Respirations are unlabored  Cardiac: Physiologic S1 and S2  Abdomen: Soft, nontender, nondistended  Extremities: Without clubbing, cyanosis or edema  Neuro: Cranial nerves II through XII are grossly intact.  Psych: No anxiety, judgement intact.        Current Facility-Administered Medications:   •  heparin injection 5,000 Units, 5,000 Units, Subcutaneous, Q8HRS, Nemesio Holcomb, D.O., 5,000 Units at 07/03/21 1242  •  aspirin (ASA) chewable tab 81 mg, 81 mg, Oral, DAILY, Nemesio Holcomb, D.O., 81 mg at 07/02/21 1813  •   baclofen (LIORESAL) tablet 10 mg, 10 mg, Oral, TID, Nemesio Holcomb, D.O., 10 mg at 07/03/21 1240  •  insulin glargine (Semglee) injection, 20 Units, Subcutaneous, QHS, Nemesio Holcomb D.O., 20 Units at 07/02/21 2130  •  levothyroxine (SYNTHROID) tablet 100 mcg, 100 mcg, Oral, DAILY, Nemesio Holcomb D.O., 100 mcg at 07/03/21 0550  •  lisinopril (PRINIVIL) tablet 40 mg, 40 mg, Oral, DAILY, Nemesio Holcomb D.O., 40 mg at 07/03/21 0550  •  nadolol (CORGARD) tablet 20 mg, 20 mg, Oral, BID, Nemesio Holcomb D.O., 20 mg at 07/03/21 0550  •  senna-docusate (PERICOLACE or SENOKOT S) 8.6-50 MG per tablet 2 tablet, 2 tablet, Oral, BID, 2 tablet at 07/03/21 0549 **AND** polyethylene glycol/lytes (MIRALAX) PACKET 1 Packet, 1 Packet, Oral, QDAY PRN **AND** magnesium hydroxide (MILK OF MAGNESIA) suspension 30 mL, 30 mL, Oral, QDAY PRN **AND** bisacodyl (DULCOLAX) suppository 10 mg, 10 mg, Rectal, QDAY PRN, Nemesio Holcomb, D.O.  •  acetaminophen (Tylenol) tablet 650 mg, 650 mg, Oral, Q6HRS PRN, Nemesio Holcomb D.O.  •  Notify provider if pain remains uncontrolled, , , CONTINUOUS **AND** Use the Numeric Rating Scale (NRS), Gold-Baker Faces (WBF), or FLACC on regular floors and Critical-Care Pain Observation Tool (CPOT) on ICUs/Trauma to assess pain, , , CONTINUOUS **AND** Pulse Ox, , , CONTINUOUS **AND** Pharmacy Consult Request ...Pain Management Review 1 Each, 1 Each, Other, PHARMACY TO DOSE **AND** If patient difficult to arouse and/or has respiratory depression (respiratory rate of 10 or less), stop any opiates that are currently infusing and call a Rapid Response., , , CONTINUOUS, Nemesio Holcomb D.O.  •  oxyCODONE immediate-release (ROXICODONE) tablet 2.5 mg, 2.5 mg, Oral, Q3HRS PRN **OR** oxyCODONE immediate-release (ROXICODONE) tablet 5 mg, 5 mg, Oral, Q3HRS PRN, 5 mg at 07/01/21 0637 **OR** [DISCONTINUED] morphine (pf) 4 mg/mL injection 2 mg, 2 mg, Intravenous, Q3HRS PRN, Nemesio Holcomb D.O.  •   cloNIDine (CATAPRES) tablet 0.1 mg, 0.1 mg, Oral, Q6HRS PRN, Nemesio Holcomb D.O.  •  enalaprilat (VASOTEC) injection 1.25 mg, 1.25 mg, Intravenous, Q6HRS PRN, Nemesio Holcomb, D.O.  •  labetalol (NORMODYNE/TRANDATE) injection 10 mg, 10 mg, Intravenous, Q4HRS PRN, Nemesio Holcomb, D.O.  •  ondansetron (ZOFRAN) syringe/vial injection 4 mg, 4 mg, Intravenous, Q4HRS PRN, Nemesio Holcomb, D.O.  •  ondansetron (ZOFRAN ODT) dispertab 4 mg, 4 mg, Oral, Q4HRS PRN, Nemesio Holcomb, D.O.  •  promethazine (PHENERGAN) tablet 12.5-25 mg, 12.5-25 mg, Oral, Q4HRS PRN, Nemesio Holcomb, D.O.  •  promethazine (PHENERGAN) suppository 12.5-25 mg, 12.5-25 mg, Rectal, Q4HRS PRN, Nemesio Holcomb, D.O.  •  prochlorperazine (COMPAZINE) injection 5-10 mg, 5-10 mg, Intravenous, Q4HRS PRN, Nemesio Holcomb, D.O.  •  insulin regular (HumuLIN R,NovoLIN R) injection, 1-6 Units, Subcutaneous, 4X/DAY ACHS, 2 Units at 07/02/21 2129 **AND** POC blood glucose manual result, , , Q AC AND BEDTIME(S) **AND** NOTIFY MD and PharmD, , , Once **AND** glucose 4 g chewable tablet 16 g, 16 g, Oral, Q15 MIN PRN **AND** dextrose 50% (D50W) injection 50 mL, 50 mL, Intravenous, Q15 MIN PRN, Nemesio Holcomb, D.O.  •  fentaNYL (SUBLIMAZE) injection 25 mcg, 25 mcg, Intravenous, Q2HRS PRN, Nemesio Holcomb, D.O.    Current Outpatient Medications:   •  oxyCODONE-acetaminophen (PERCOCET-10)  MG Tab, Take 1 tablet by mouth every four hours as needed for Severe Pain for up to 7 days., Disp: 28 tablet, Rfl: 0  •  insulin lispro (HUMALOG KWIKPEN) 100 UNIT/ML Solution Pen-injector injection PEN, Inject 4-18 Units under the skin 3 times a day with meals., Disp: , Rfl:   •  nadolol (CORGARD) 20 MG TABS, Take 20 mg by mouth 2 times a day., Disp: , Rfl:   •  Insulin Glargine (LANTUS SC), Inject 20 Units under the skin at bedtime., Disp: , Rfl:   •  levothyroxine (SYNTHROID) 100 MCG TABS, Take 100 mcg by mouth every day. Indications: Underactive Thyroid,  Disp: , Rfl:   •  Interferon Beta-1b (BETASERON SC), Inject 1 mL under the skin every 48 hours. Every other day administration , Disp: , Rfl:   •  baclofen (LIORESAL) 10 MG TABS, Take 10 mg by mouth 3 times a day., Disp: , Rfl:   •  aspirin 81 MG tablet, Take 81 mg by mouth every day., Disp: , Rfl:       Fluids  No intake or output data in the 24 hours ending 07/03/21 1656    Laboratory  Recent Labs     06/30/21  2314   WBC 7.4   RBC 5.00   HEMOGLOBIN 14.6   HEMATOCRIT 43.7   MCV 87.4   MCH 29.2   MCHC 33.4*   RDW 44.5   PLATELETCT 140*   MPV 11.0     Recent Labs     06/30/21  2314   SODIUM 141   POTASSIUM 4.4   CHLORIDE 106   CO2 24   GLUCOSE 148*   BUN 25*   CREATININE 0.76   CALCIUM 9.0                   Imaging  OUTSIDE IMAGES-CT CERVICAL SPINE   Final Result   CT scan of the C-spine obtained from [facility] in [facility] shows evidence of a subtle nondisplaced fracture inferior anterior tip of the C5 vertebral body best seen on image 26 series 7 and less well seen reconstructed coronal image 27.  Bones are osteopenic.  Hardware is intact from prior anterior discectomy and fusion C3-C4.  Loss of normal lordotic curvature is noted at C6-C7.  Prominent anterior osteophytes are noted with flowing osteophytes.  C6-T1 with some slight vertebral body height loss.  C6 and C7 which could be from acute mild compression fractures or old injuries.  If this is important to delineate, MRI could make the distinction.  No spinal stenosis or neuroforaminal space narrowing is present.    Impression:  1. Subtle acute nondisplaced fracture anterior inferior tip C5 vertebral body.  2. Minimal vertebral body height loss at C6-C7 may well be chronic     OUTSIDE IMAGES-CT HEAD   Final Result      MR-CERVICAL SPINE-WITH & W/O    (Results Pending)        Assessment/Plan  * Closed fracture of fifth cervical vertebra (HCC)- (present on admission)  Assessment & Plan  Per ERP, outside CT scanning reveals C5 vertebral  body fracture  Dr. Abrams neurosurgery has been consulted and the neurosurgical team would appreciate MRI of the C-spine to better guide therapy.  C-spine collar in place  Analgesics for pain control      Other specified hypothyroidism- (present on admission)  Assessment & Plan  Continue Synthroid 100 mcg p.o. daily    Type 2 diabetes mellitus without complication, with long-term current use of insulin (HCC)- (present on admission)  Assessment & Plan  Continue insulin glargine 20 units at bedtime  Hemoglobin A1c ordered  Recommend daily aspirin 81 mg p.o. daily  Continue lisinopril 40 mg p.o. nightly  Low SSI  Follow-up outpatient PCP and endocrinology upon discharge    Relapsing remitting multiple sclerosis (HCC)- (present on admission)  Assessment & Plan  Continue baclofen 10 mg p.o. twice daily  We will hold interferon beta at this time  Follow-up outpatient neurology and PCP upon discharge       VTE prophylaxis: Heparin's as ordered.

## 2021-07-03 NOTE — PROGRESS NOTES
In a chair for about 30 minutes, complained of dizziness, helped back in better and felt better.  FB=354/61P=64, R= 16, temp=97.0 f and O2 sat 91% on  room air. Glucose level 174mg/dl .  Rm WHITE  and Cynthia DESAI for Dr Jose Alberto Rosas ordered to keep patient over night.

## 2021-07-03 NOTE — CARE PLAN
The patient is Stable - Low risk of patient condition declining or worsening    Shift Goals  Clinical Goals: pain managmenet; safety  Patient Goals: rest    Progress made toward(s) clinical / shift goals:  patient reports pain in arms is tolerable, meeting goa. Calls for assistance with ambulation.     Patient is not progressing towards the following goals:

## 2021-07-03 NOTE — PROGRESS NOTES
Neurosurgery Progress Note    Subjective:  No acute events    Exam:  VSS  A&Ox4, NAD  No hoarseness of voice.   Trachea midline, no difficulty swallowing - no coughing or choking while drinking water   No nuchal rigidity   NM: 5/5 deltoid, biceps, triceps, handgrip, intrinsics   5/5 BLE  Some hypersensitivity along C6 distribution bilaterally  Pulmonary: non-labored breathing on room air, normal respiratory effort  No LE edema, erythema, cyanosis, clubbing  C-collar being worn appropriately      BP  Min: 120/60  Max: 136/72  Pulse  Av.4  Min: 60  Max: 69  Resp  Av.8  Min: 16  Max: 18  Temp  Av.7 °C (98.1 °F)  Min: 36.1 °C (97 °F)  Max: 37.2 °C (99 °F)  SpO2  Av.8 %  Min: 91 %  Max: 96 %    No data recorded    Recent Labs     21  2314   WBC 7.4   RBC 5.00   HEMOGLOBIN 14.6   HEMATOCRIT 43.7   MCV 87.4   MCH 29.2   MCHC 33.4*   RDW 44.5   PLATELETCT 140*   MPV 11.0     Recent Labs     21  2314   SODIUM 141   POTASSIUM 4.4   CHLORIDE 106   CO2 24   GLUCOSE 148*   BUN 25*   CREATININE 0.76   CALCIUM 9.0               Intake/Output                       21 - 21 - 21 0659     1900-0659 Total  Total                 Intake    Total Intake -- -- -- -- -- --       Output    Urine  --  -- --  --  -- --    Number of Times Voided -- 1 x 1 x -- -- --    Stool  --  -- --  --  -- --    Number of Times Stooled 1 x 1 x 2 x -- -- --    Total Output -- -- -- -- -- --       Net I/O     -- -- -- -- -- --          No intake or output data in the 24 hours ending 21 0742         • heparin  5,000 Units Q8HRS   • aspirin  81 mg DAILY   • baclofen  10 mg TID   • insulin glargine  20 Units QHS   • levothyroxine  100 mcg DAILY   • lisinopril  40 mg DAILY   • nadolol  20 mg BID   • senna-docusate  2 tablet BID    And   • polyethylene glycol/lytes  1 Packet QDAY PRN    And   • magnesium hydroxide  30 mL QDAY PRN    And   • bisacodyl  10 mg  QDAY PRN   • acetaminophen  650 mg Q6HRS PRN   • Pharmacy Consult Request  1 Each PHARMACY TO DOSE   • oxyCODONE immediate-release  2.5 mg Q3HRS PRN    Or   • oxyCODONE immediate-release  5 mg Q3HRS PRN   • cloNIDine  0.1 mg Q6HRS PRN   • enalaprilat  1.25 mg Q6HRS PRN   • labetalol  10 mg Q4HRS PRN   • ondansetron  4 mg Q4HRS PRN   • ondansetron  4 mg Q4HRS PRN   • promethazine  12.5-25 mg Q4HRS PRN   • promethazine  12.5-25 mg Q4HRS PRN   • prochlorperazine  5-10 mg Q4HRS PRN   • insulin regular  1-6 Units 4X/DAY ACHS    And   • glucose  16 g Q15 MIN PRN    And   • dextrose 50%  50 mL Q15 MIN PRN   • fentaNYL  25 mcg Q2HRS PRN       Assessment and Plan:  Hospital day # 3    Patient was seen and examined by Dr. Abrams   Recommended C4,5 ACDF with Plate, C3,4 plate removal, and possible C4,5 laminectomy instrumentation, fusion.   Will be scheduling surgery within the next couple of weeks   Continue rigid C-collar  Okay to D/c home from NS perspective    Case d/w Dr. Abrams

## 2021-07-03 NOTE — PROGRESS NOTES
BP Lying down BP =125/71, P =61        Sitting          BP = 118/74, P =67         Standing     BP = 82/48, P= 50

## 2021-07-14 ENCOUNTER — HOSPITAL ENCOUNTER (OUTPATIENT)
Dept: RADIOLOGY | Facility: MEDICAL CENTER | Age: 66
End: 2021-07-14
Payer: COMMERCIAL

## 2021-07-15 ENCOUNTER — PRE-ADMISSION TESTING (OUTPATIENT)
Dept: ADMISSIONS | Facility: MEDICAL CENTER | Age: 66
DRG: 473 | End: 2021-07-15
Attending: NEUROLOGICAL SURGERY
Payer: COMMERCIAL

## 2021-07-15 DIAGNOSIS — Z01.812 PRE-OPERATIVE LABORATORY EXAMINATION: ICD-10-CM

## 2021-07-15 LAB
EST. AVERAGE GLUCOSE BLD GHB EST-MCNC: 146 MG/DL
HBA1C MFR BLD: 6.7 % (ref 4–5.6)

## 2021-07-15 PROCEDURE — 83036 HEMOGLOBIN GLYCOSYLATED A1C: CPT

## 2021-07-15 PROCEDURE — 36415 COLL VENOUS BLD VENIPUNCTURE: CPT

## 2021-07-15 RX ORDER — LISINOPRIL 10 MG/1
10 TABLET ORAL DAILY
COMMUNITY
Start: 2021-05-12

## 2021-07-16 ENCOUNTER — HOSPITAL ENCOUNTER (INPATIENT)
Facility: MEDICAL CENTER | Age: 66
LOS: 1 days | DRG: 473 | End: 2021-07-17
Attending: NEUROLOGICAL SURGERY | Admitting: NEUROLOGICAL SURGERY
Payer: COMMERCIAL

## 2021-07-16 ENCOUNTER — ANESTHESIA (OUTPATIENT)
Dept: SURGERY | Facility: MEDICAL CENTER | Age: 66
DRG: 473 | End: 2021-07-16
Payer: COMMERCIAL

## 2021-07-16 ENCOUNTER — APPOINTMENT (OUTPATIENT)
Dept: RADIOLOGY | Facility: MEDICAL CENTER | Age: 66
DRG: 473 | End: 2021-07-16
Attending: NEUROLOGICAL SURGERY
Payer: COMMERCIAL

## 2021-07-16 ENCOUNTER — ANESTHESIA EVENT (OUTPATIENT)
Dept: SURGERY | Facility: MEDICAL CENTER | Age: 66
DRG: 473 | End: 2021-07-16
Payer: COMMERCIAL

## 2021-07-16 LAB — GLUCOSE BLD-MCNC: 143 MG/DL (ref 65–99)

## 2021-07-16 PROCEDURE — 72040 X-RAY EXAM NECK SPINE 2-3 VW: CPT

## 2021-07-16 PROCEDURE — 700111 HCHG RX REV CODE 636 W/ 250 OVERRIDE (IP): Performed by: ANESTHESIOLOGY

## 2021-07-16 PROCEDURE — 700105 HCHG RX REV CODE 258: Performed by: NEUROLOGICAL SURGERY

## 2021-07-16 PROCEDURE — 770006 HCHG ROOM/CARE - MED/SURG/GYN SEMI*

## 2021-07-16 PROCEDURE — 4A11X4G MONITORING OF PERIPHERAL NERVOUS ELECTRICAL ACTIVITY, INTRAOPERATIVE, EXTERNAL APPROACH: ICD-10-PCS | Performed by: NEUROLOGICAL SURGERY

## 2021-07-16 PROCEDURE — 160009 HCHG ANES TIME/MIN: Performed by: NEUROLOGICAL SURGERY

## 2021-07-16 PROCEDURE — C1713 ANCHOR/SCREW BN/BN,TIS/BN: HCPCS | Performed by: NEUROLOGICAL SURGERY

## 2021-07-16 PROCEDURE — 502240 HCHG MISC OR SUPPLY RC 0272: Performed by: NEUROLOGICAL SURGERY

## 2021-07-16 PROCEDURE — 700105 HCHG RX REV CODE 258: Performed by: ANESTHESIOLOGY

## 2021-07-16 PROCEDURE — 95864 NEEDLE EMG 4 EXTREMITIES: CPT | Performed by: NEUROLOGICAL SURGERY

## 2021-07-16 PROCEDURE — 500864 HCHG NEEDLE, SPINAL 18G: Performed by: NEUROLOGICAL SURGERY

## 2021-07-16 PROCEDURE — A9270 NON-COVERED ITEM OR SERVICE: HCPCS | Performed by: NEUROLOGICAL SURGERY

## 2021-07-16 PROCEDURE — 700102 HCHG RX REV CODE 250 W/ 637 OVERRIDE(OP): Performed by: ANESTHESIOLOGY

## 2021-07-16 PROCEDURE — 700111 HCHG RX REV CODE 636 W/ 250 OVERRIDE (IP): Performed by: NEUROLOGICAL SURGERY

## 2021-07-16 PROCEDURE — 95940 IONM IN OPERATNG ROOM 15 MIN: CPT | Performed by: NEUROLOGICAL SURGERY

## 2021-07-16 PROCEDURE — 502000 HCHG MISC OR IMPLANTS RC 0278: Performed by: NEUROLOGICAL SURGERY

## 2021-07-16 PROCEDURE — 500367 HCHG DRAIN KIT, HEMOVAC: Performed by: NEUROLOGICAL SURGERY

## 2021-07-16 PROCEDURE — 0RG10A0 FUSION OF CERVICAL VERTEBRAL JOINT WITH INTERBODY FUSION DEVICE, ANTERIOR APPROACH, ANTERIOR COLUMN, OPEN APPROACH: ICD-10-PCS | Performed by: NEUROLOGICAL SURGERY

## 2021-07-16 PROCEDURE — 160048 HCHG OR STATISTICAL LEVEL 1-5: Performed by: NEUROLOGICAL SURGERY

## 2021-07-16 PROCEDURE — 700102 HCHG RX REV CODE 250 W/ 637 OVERRIDE(OP): Performed by: STUDENT IN AN ORGANIZED HEALTH CARE EDUCATION/TRAINING PROGRAM

## 2021-07-16 PROCEDURE — 160002 HCHG RECOVERY MINUTES (STAT): Performed by: NEUROLOGICAL SURGERY

## 2021-07-16 PROCEDURE — 00NW0ZZ RELEASE CERVICAL SPINAL CORD, OPEN APPROACH: ICD-10-PCS | Performed by: NEUROLOGICAL SURGERY

## 2021-07-16 PROCEDURE — 95939 C MOTOR EVOKED UPR&LWR LIMBS: CPT | Performed by: NEUROLOGICAL SURGERY

## 2021-07-16 PROCEDURE — A9270 NON-COVERED ITEM OR SERVICE: HCPCS | Performed by: ANESTHESIOLOGY

## 2021-07-16 PROCEDURE — 160029 HCHG SURGERY MINUTES - 1ST 30 MINS LEVEL 4: Performed by: NEUROLOGICAL SURGERY

## 2021-07-16 PROCEDURE — 700101 HCHG RX REV CODE 250: Performed by: NEUROLOGICAL SURGERY

## 2021-07-16 PROCEDURE — 700101 HCHG RX REV CODE 250: Performed by: ANESTHESIOLOGY

## 2021-07-16 PROCEDURE — 94760 N-INVAS EAR/PLS OXIMETRY 1: CPT

## 2021-07-16 PROCEDURE — 0RB30ZZ EXCISION OF CERVICAL VERTEBRAL DISC, OPEN APPROACH: ICD-10-PCS | Performed by: NEUROLOGICAL SURGERY

## 2021-07-16 PROCEDURE — 160035 HCHG PACU - 1ST 60 MINS PHASE I: Performed by: NEUROLOGICAL SURGERY

## 2021-07-16 PROCEDURE — 500885 HCHG PACK, JACKSON TABLE: Performed by: NEUROLOGICAL SURGERY

## 2021-07-16 PROCEDURE — 501838 HCHG SUTURE GENERAL: Performed by: NEUROLOGICAL SURGERY

## 2021-07-16 PROCEDURE — 82962 GLUCOSE BLOOD TEST: CPT | Mod: 91

## 2021-07-16 PROCEDURE — 95925 SOMATOSENSORY TESTING: CPT | Performed by: NEUROLOGICAL SURGERY

## 2021-07-16 PROCEDURE — C1821 INTERSPINOUS IMPLANT: HCPCS | Performed by: NEUROLOGICAL SURGERY

## 2021-07-16 PROCEDURE — 0RP104Z REMOVAL OF INTERNAL FIXATION DEVICE FROM CERVICAL VERTEBRAL JOINT, OPEN APPROACH: ICD-10-PCS | Performed by: NEUROLOGICAL SURGERY

## 2021-07-16 PROCEDURE — 95937 NEUROMUSCULAR JUNCTION TEST: CPT | Performed by: NEUROLOGICAL SURGERY

## 2021-07-16 PROCEDURE — 160041 HCHG SURGERY MINUTES - EA ADDL 1 MIN LEVEL 4: Performed by: NEUROLOGICAL SURGERY

## 2021-07-16 PROCEDURE — 700102 HCHG RX REV CODE 250 W/ 637 OVERRIDE(OP): Performed by: NEUROLOGICAL SURGERY

## 2021-07-16 PROCEDURE — 500444 HCHG HEMOSTAT, SURGICEL 2X3: Performed by: NEUROLOGICAL SURGERY

## 2021-07-16 PROCEDURE — 110454 HCHG SHELL REV 250: Performed by: NEUROLOGICAL SURGERY

## 2021-07-16 DEVICE — IMPLANTABLE DEVICE: Type: IMPLANTABLE DEVICE | Site: SPINE CERVICAL | Status: FUNCTIONAL

## 2021-07-16 RX ORDER — OXYCODONE HCL 5 MG/5 ML
5 SOLUTION, ORAL ORAL
Status: COMPLETED | OUTPATIENT
Start: 2021-07-16 | End: 2021-07-16

## 2021-07-16 RX ORDER — ALPRAZOLAM 0.25 MG/1
0.25 TABLET ORAL 2 TIMES DAILY PRN
Status: DISCONTINUED | OUTPATIENT
Start: 2021-07-16 | End: 2021-07-17 | Stop reason: HOSPADM

## 2021-07-16 RX ORDER — NADOLOL 20 MG/1
20 TABLET ORAL 2 TIMES DAILY
Status: DISCONTINUED | OUTPATIENT
Start: 2021-07-16 | End: 2021-07-17 | Stop reason: HOSPADM

## 2021-07-16 RX ORDER — VITAMIN B COMPLEX
5000 TABLET ORAL DAILY
Status: DISCONTINUED | OUTPATIENT
Start: 2021-07-16 | End: 2021-07-17 | Stop reason: HOSPADM

## 2021-07-16 RX ORDER — MAGNESIUM SULFATE HEPTAHYDRATE 500 MG/ML
INJECTION, SOLUTION INTRAMUSCULAR; INTRAVENOUS PRN
Status: DISCONTINUED | OUTPATIENT
Start: 2021-07-16 | End: 2021-07-16 | Stop reason: SURG

## 2021-07-16 RX ORDER — ONDANSETRON 2 MG/ML
4 INJECTION INTRAMUSCULAR; INTRAVENOUS
Status: DISCONTINUED | OUTPATIENT
Start: 2021-07-16 | End: 2021-07-16 | Stop reason: HOSPADM

## 2021-07-16 RX ORDER — DIPHENHYDRAMINE HYDROCHLORIDE 50 MG/ML
12.5 INJECTION INTRAMUSCULAR; INTRAVENOUS
Status: DISCONTINUED | OUTPATIENT
Start: 2021-07-16 | End: 2021-07-16 | Stop reason: HOSPADM

## 2021-07-16 RX ORDER — CEFAZOLIN SODIUM 1 G/3ML
INJECTION, POWDER, FOR SOLUTION INTRAMUSCULAR; INTRAVENOUS
Status: DISCONTINUED | OUTPATIENT
Start: 2021-07-16 | End: 2021-07-16 | Stop reason: HOSPADM

## 2021-07-16 RX ORDER — REMIFENTANIL HYDROCHLORIDE 1 MG/ML
INJECTION, POWDER, LYOPHILIZED, FOR SOLUTION INTRAVENOUS
Status: DISCONTINUED | OUTPATIENT
Start: 2021-07-16 | End: 2021-07-16 | Stop reason: SURG

## 2021-07-16 RX ORDER — HALOPERIDOL 5 MG/ML
1 INJECTION INTRAMUSCULAR
Status: DISCONTINUED | OUTPATIENT
Start: 2021-07-16 | End: 2021-07-16 | Stop reason: HOSPADM

## 2021-07-16 RX ORDER — BISACODYL 10 MG
10 SUPPOSITORY, RECTAL RECTAL
Status: DISCONTINUED | OUTPATIENT
Start: 2021-07-16 | End: 2021-07-17 | Stop reason: HOSPADM

## 2021-07-16 RX ORDER — INSULIN LISPRO 100 [IU]/ML
3-14 INJECTION, SOLUTION INTRAVENOUS; SUBCUTANEOUS
Status: DISCONTINUED | OUTPATIENT
Start: 2021-07-16 | End: 2021-07-17 | Stop reason: HOSPADM

## 2021-07-16 RX ORDER — PROMETHAZINE HYDROCHLORIDE 25 MG/1
12.5-25 TABLET ORAL EVERY 4 HOURS PRN
Status: DISCONTINUED | OUTPATIENT
Start: 2021-07-16 | End: 2021-07-17 | Stop reason: HOSPADM

## 2021-07-16 RX ORDER — DIPHENHYDRAMINE HCL 25 MG
25 TABLET ORAL EVERY 6 HOURS PRN
Status: DISCONTINUED | OUTPATIENT
Start: 2021-07-16 | End: 2021-07-17 | Stop reason: HOSPADM

## 2021-07-16 RX ORDER — CLINDAMYCIN PHOSPHATE 900 MG/50ML
900 INJECTION, SOLUTION INTRAVENOUS ONCE
Status: DISCONTINUED | OUTPATIENT
Start: 2021-07-16 | End: 2021-07-16 | Stop reason: HOSPADM

## 2021-07-16 RX ORDER — BUPIVACAINE HYDROCHLORIDE AND EPINEPHRINE 5; 5 MG/ML; UG/ML
INJECTION, SOLUTION EPIDURAL; INTRACAUDAL; PERINEURAL
Status: DISCONTINUED | OUTPATIENT
Start: 2021-07-16 | End: 2021-07-16 | Stop reason: HOSPADM

## 2021-07-16 RX ORDER — PROMETHAZINE HYDROCHLORIDE 25 MG/1
12.5-25 SUPPOSITORY RECTAL EVERY 4 HOURS PRN
Status: DISCONTINUED | OUTPATIENT
Start: 2021-07-16 | End: 2021-07-17 | Stop reason: HOSPADM

## 2021-07-16 RX ORDER — ACETAMINOPHEN 500 MG
1000 TABLET ORAL EVERY 6 HOURS PRN
Status: DISCONTINUED | OUTPATIENT
Start: 2021-07-21 | End: 2021-07-17 | Stop reason: HOSPADM

## 2021-07-16 RX ORDER — OXYCODONE HYDROCHLORIDE 5 MG/1
5 TABLET ORAL
Status: DISCONTINUED | OUTPATIENT
Start: 2021-07-16 | End: 2021-07-17 | Stop reason: HOSPADM

## 2021-07-16 RX ORDER — ONDANSETRON 2 MG/ML
4 INJECTION INTRAMUSCULAR; INTRAVENOUS EVERY 4 HOURS PRN
Status: DISCONTINUED | OUTPATIENT
Start: 2021-07-16 | End: 2021-07-17 | Stop reason: HOSPADM

## 2021-07-16 RX ORDER — DIPHENHYDRAMINE HYDROCHLORIDE 50 MG/ML
25 INJECTION INTRAMUSCULAR; INTRAVENOUS EVERY 6 HOURS PRN
Status: DISCONTINUED | OUTPATIENT
Start: 2021-07-16 | End: 2021-07-17 | Stop reason: HOSPADM

## 2021-07-16 RX ORDER — SODIUM CHLORIDE 9 MG/ML
INJECTION, SOLUTION INTRAVENOUS CONTINUOUS
Status: DISCONTINUED | OUTPATIENT
Start: 2021-07-16 | End: 2021-07-17 | Stop reason: HOSPADM

## 2021-07-16 RX ORDER — HYDRALAZINE HYDROCHLORIDE 20 MG/ML
5 INJECTION INTRAMUSCULAR; INTRAVENOUS
Status: DISCONTINUED | OUTPATIENT
Start: 2021-07-16 | End: 2021-07-16 | Stop reason: HOSPADM

## 2021-07-16 RX ORDER — LEVOTHYROXINE SODIUM 0.1 MG/1
100 TABLET ORAL DAILY
Status: DISCONTINUED | OUTPATIENT
Start: 2021-07-16 | End: 2021-07-17 | Stop reason: HOSPADM

## 2021-07-16 RX ORDER — HYDROMORPHONE HYDROCHLORIDE 1 MG/ML
0.5 INJECTION, SOLUTION INTRAMUSCULAR; INTRAVENOUS; SUBCUTANEOUS
Status: DISCONTINUED | OUTPATIENT
Start: 2021-07-16 | End: 2021-07-16 | Stop reason: HOSPADM

## 2021-07-16 RX ORDER — AMOXICILLIN 250 MG
1 CAPSULE ORAL
Status: DISCONTINUED | OUTPATIENT
Start: 2021-07-16 | End: 2021-07-17 | Stop reason: HOSPADM

## 2021-07-16 RX ORDER — MIDAZOLAM HYDROCHLORIDE 1 MG/ML
INJECTION INTRAMUSCULAR; INTRAVENOUS PRN
Status: DISCONTINUED | OUTPATIENT
Start: 2021-07-16 | End: 2021-07-16 | Stop reason: SURG

## 2021-07-16 RX ORDER — LIDOCAINE HYDROCHLORIDE 20 MG/ML
INJECTION, SOLUTION EPIDURAL; INFILTRATION; INTRACAUDAL; PERINEURAL PRN
Status: DISCONTINUED | OUTPATIENT
Start: 2021-07-16 | End: 2021-07-16 | Stop reason: SURG

## 2021-07-16 RX ORDER — AMOXICILLIN 250 MG
1 CAPSULE ORAL NIGHTLY
Status: DISCONTINUED | OUTPATIENT
Start: 2021-07-16 | End: 2021-07-17 | Stop reason: HOSPADM

## 2021-07-16 RX ORDER — SODIUM CHLORIDE, SODIUM LACTATE, POTASSIUM CHLORIDE, CALCIUM CHLORIDE 600; 310; 30; 20 MG/100ML; MG/100ML; MG/100ML; MG/100ML
INJECTION, SOLUTION INTRAVENOUS CONTINUOUS
Status: ACTIVE | OUTPATIENT
Start: 2021-07-16 | End: 2021-07-16

## 2021-07-16 RX ORDER — PROCHLORPERAZINE EDISYLATE 5 MG/ML
5-10 INJECTION INTRAMUSCULAR; INTRAVENOUS EVERY 4 HOURS PRN
Status: DISCONTINUED | OUTPATIENT
Start: 2021-07-16 | End: 2021-07-17 | Stop reason: HOSPADM

## 2021-07-16 RX ORDER — HYDROMORPHONE HYDROCHLORIDE 1 MG/ML
0.2 INJECTION, SOLUTION INTRAMUSCULAR; INTRAVENOUS; SUBCUTANEOUS
Status: DISCONTINUED | OUTPATIENT
Start: 2021-07-16 | End: 2021-07-16 | Stop reason: HOSPADM

## 2021-07-16 RX ORDER — DEXTROSE MONOHYDRATE 25 G/50ML
50 INJECTION, SOLUTION INTRAVENOUS
Status: DISCONTINUED | OUTPATIENT
Start: 2021-07-16 | End: 2021-07-17 | Stop reason: HOSPADM

## 2021-07-16 RX ORDER — BACLOFEN 10 MG/1
10 TABLET ORAL 3 TIMES DAILY
Status: DISCONTINUED | OUTPATIENT
Start: 2021-07-16 | End: 2021-07-17 | Stop reason: HOSPADM

## 2021-07-16 RX ORDER — DOCUSATE SODIUM 100 MG/1
100 CAPSULE, LIQUID FILLED ORAL 2 TIMES DAILY
Status: DISCONTINUED | OUTPATIENT
Start: 2021-07-16 | End: 2021-07-17 | Stop reason: HOSPADM

## 2021-07-16 RX ORDER — ENEMA 19; 7 G/133ML; G/133ML
1 ENEMA RECTAL
Status: DISCONTINUED | OUTPATIENT
Start: 2021-07-16 | End: 2021-07-17 | Stop reason: HOSPADM

## 2021-07-16 RX ORDER — LISINOPRIL 10 MG/1
10 TABLET ORAL DAILY
Status: DISCONTINUED | OUTPATIENT
Start: 2021-07-16 | End: 2021-07-17 | Stop reason: HOSPADM

## 2021-07-16 RX ORDER — GABAPENTIN 300 MG/1
300 CAPSULE ORAL ONCE
Status: COMPLETED | OUTPATIENT
Start: 2021-07-16 | End: 2021-07-16

## 2021-07-16 RX ORDER — DIAZEPAM 5 MG/1
5 TABLET ORAL EVERY 6 HOURS PRN
Status: DISCONTINUED | OUTPATIENT
Start: 2021-07-16 | End: 2021-07-17 | Stop reason: HOSPADM

## 2021-07-16 RX ORDER — IBUPROFEN 200 MG
200 TABLET ORAL
COMMUNITY

## 2021-07-16 RX ORDER — ACETAMINOPHEN 500 MG
1000 TABLET ORAL ONCE
Status: COMPLETED | OUTPATIENT
Start: 2021-07-16 | End: 2021-07-16

## 2021-07-16 RX ORDER — HYDROMORPHONE HYDROCHLORIDE 2 MG/ML
INJECTION, SOLUTION INTRAMUSCULAR; INTRAVENOUS; SUBCUTANEOUS PRN
Status: DISCONTINUED | OUTPATIENT
Start: 2021-07-16 | End: 2021-07-16 | Stop reason: SURG

## 2021-07-16 RX ORDER — CALCIUM CARBONATE 500 MG/1
500 TABLET, CHEWABLE ORAL 2 TIMES DAILY
Status: DISCONTINUED | OUTPATIENT
Start: 2021-07-16 | End: 2021-07-17 | Stop reason: HOSPADM

## 2021-07-16 RX ORDER — HYDROMORPHONE HYDROCHLORIDE 1 MG/ML
0.25 INJECTION, SOLUTION INTRAMUSCULAR; INTRAVENOUS; SUBCUTANEOUS
Status: DISCONTINUED | OUTPATIENT
Start: 2021-07-16 | End: 2021-07-17 | Stop reason: HOSPADM

## 2021-07-16 RX ORDER — ONDANSETRON 2 MG/ML
INJECTION INTRAMUSCULAR; INTRAVENOUS PRN
Status: DISCONTINUED | OUTPATIENT
Start: 2021-07-16 | End: 2021-07-16 | Stop reason: SURG

## 2021-07-16 RX ORDER — INSULIN LISPRO 100 [IU]/ML
4-18 INJECTION, SOLUTION INTRAVENOUS; SUBCUTANEOUS
Status: DISCONTINUED | OUTPATIENT
Start: 2021-07-16 | End: 2021-07-16

## 2021-07-16 RX ORDER — LORAZEPAM 2 MG/ML
0.5 INJECTION INTRAMUSCULAR
Status: DISCONTINUED | OUTPATIENT
Start: 2021-07-16 | End: 2021-07-16 | Stop reason: HOSPADM

## 2021-07-16 RX ORDER — POLYETHYLENE GLYCOL 3350 17 G/17G
1 POWDER, FOR SOLUTION ORAL 2 TIMES DAILY PRN
Status: DISCONTINUED | OUTPATIENT
Start: 2021-07-16 | End: 2021-07-17 | Stop reason: HOSPADM

## 2021-07-16 RX ORDER — DEXAMETHASONE SODIUM PHOSPHATE 4 MG/ML
INJECTION, SOLUTION INTRA-ARTICULAR; INTRALESIONAL; INTRAMUSCULAR; INTRAVENOUS; SOFT TISSUE PRN
Status: DISCONTINUED | OUTPATIENT
Start: 2021-07-16 | End: 2021-07-16 | Stop reason: SURG

## 2021-07-16 RX ORDER — CEFAZOLIN SODIUM 2 G/100ML
2 INJECTION, SOLUTION INTRAVENOUS EVERY 8 HOURS
Status: COMPLETED | OUTPATIENT
Start: 2021-07-16 | End: 2021-07-17

## 2021-07-16 RX ORDER — ONDANSETRON 4 MG/1
4 TABLET, ORALLY DISINTEGRATING ORAL EVERY 4 HOURS PRN
Status: DISCONTINUED | OUTPATIENT
Start: 2021-07-16 | End: 2021-07-17 | Stop reason: HOSPADM

## 2021-07-16 RX ORDER — ROCURONIUM BROMIDE 10 MG/ML
INJECTION, SOLUTION INTRAVENOUS PRN
Status: DISCONTINUED | OUTPATIENT
Start: 2021-07-16 | End: 2021-07-16 | Stop reason: SURG

## 2021-07-16 RX ORDER — HYDROMORPHONE HYDROCHLORIDE 1 MG/ML
0.4 INJECTION, SOLUTION INTRAMUSCULAR; INTRAVENOUS; SUBCUTANEOUS
Status: DISCONTINUED | OUTPATIENT
Start: 2021-07-16 | End: 2021-07-16 | Stop reason: HOSPADM

## 2021-07-16 RX ORDER — OXYCODONE HYDROCHLORIDE 5 MG/1
2.5 TABLET ORAL
Status: DISCONTINUED | OUTPATIENT
Start: 2021-07-16 | End: 2021-07-17 | Stop reason: HOSPADM

## 2021-07-16 RX ORDER — DEXAMETHASONE SODIUM PHOSPHATE 4 MG/ML
4 INJECTION, SOLUTION INTRA-ARTICULAR; INTRALESIONAL; INTRAMUSCULAR; INTRAVENOUS; SOFT TISSUE EVERY 6 HOURS
Status: COMPLETED | OUTPATIENT
Start: 2021-07-16 | End: 2021-07-17

## 2021-07-16 RX ORDER — CEFAZOLIN SODIUM 1 G/3ML
INJECTION, POWDER, FOR SOLUTION INTRAMUSCULAR; INTRAVENOUS PRN
Status: DISCONTINUED | OUTPATIENT
Start: 2021-07-16 | End: 2021-07-16 | Stop reason: SURG

## 2021-07-16 RX ORDER — SODIUM CHLORIDE, SODIUM LACTATE, POTASSIUM CHLORIDE, CALCIUM CHLORIDE 600; 310; 30; 20 MG/100ML; MG/100ML; MG/100ML; MG/100ML
INJECTION, SOLUTION INTRAVENOUS CONTINUOUS
Status: DISCONTINUED | OUTPATIENT
Start: 2021-07-16 | End: 2021-07-16 | Stop reason: HOSPADM

## 2021-07-16 RX ORDER — INSULIN GLARGINE 100 [IU]/ML
20 INJECTION, SOLUTION SUBCUTANEOUS
Status: DISCONTINUED | OUTPATIENT
Start: 2021-07-16 | End: 2021-07-17 | Stop reason: HOSPADM

## 2021-07-16 RX ORDER — OXYCODONE HCL 5 MG/5 ML
10 SOLUTION, ORAL ORAL
Status: COMPLETED | OUTPATIENT
Start: 2021-07-16 | End: 2021-07-16

## 2021-07-16 RX ORDER — MEPERIDINE HYDROCHLORIDE 25 MG/ML
12.5 INJECTION INTRAMUSCULAR; INTRAVENOUS; SUBCUTANEOUS
Status: DISCONTINUED | OUTPATIENT
Start: 2021-07-16 | End: 2021-07-16 | Stop reason: HOSPADM

## 2021-07-16 RX ORDER — ACETAMINOPHEN 500 MG
1000 TABLET ORAL EVERY 6 HOURS
Status: DISCONTINUED | OUTPATIENT
Start: 2021-07-16 | End: 2021-07-17 | Stop reason: HOSPADM

## 2021-07-16 RX ADMIN — DEXAMETHASONE SODIUM PHOSPHATE 4 MG: 4 INJECTION, SOLUTION INTRA-ARTICULAR; INTRALESIONAL; INTRAMUSCULAR; INTRAVENOUS; SOFT TISSUE at 23:42

## 2021-07-16 RX ADMIN — CEFAZOLIN 2 G: 330 INJECTION, POWDER, FOR SOLUTION INTRAMUSCULAR; INTRAVENOUS at 09:36

## 2021-07-16 RX ADMIN — CEFAZOLIN SODIUM 2 G: 2 INJECTION, SOLUTION INTRAVENOUS at 16:43

## 2021-07-16 RX ADMIN — GABAPENTIN 300 MG: 300 CAPSULE ORAL at 08:16

## 2021-07-16 RX ADMIN — BACLOFEN 10 MG: 10 TABLET ORAL at 21:19

## 2021-07-16 RX ADMIN — Medication 5000 UNITS: at 17:20

## 2021-07-16 RX ADMIN — SODIUM CHLORIDE, POTASSIUM CHLORIDE, SODIUM LACTATE AND CALCIUM CHLORIDE: 600; 310; 30; 20 INJECTION, SOLUTION INTRAVENOUS at 10:50

## 2021-07-16 RX ADMIN — SODIUM CHLORIDE: 9 INJECTION, SOLUTION INTRAVENOUS at 16:43

## 2021-07-16 RX ADMIN — CEFAZOLIN SODIUM 2 G: 2 INJECTION, SOLUTION INTRAVENOUS at 23:41

## 2021-07-16 RX ADMIN — BENZOCAINE AND MENTHOL 1 LOZENGE: 15; 3.6 LOZENGE ORAL at 17:13

## 2021-07-16 RX ADMIN — LIDOCAINE HYDROCHLORIDE 0.5 ML: 10 INJECTION, SOLUTION EPIDURAL; INFILTRATION; INTRACAUDAL at 08:35

## 2021-07-16 RX ADMIN — REMIFENTANIL HYDROCHLORIDE 0.2 MCG/KG/MIN: 1 INJECTION, POWDER, LYOPHILIZED, FOR SOLUTION INTRAVENOUS at 09:36

## 2021-07-16 RX ADMIN — BACLOFEN 10 MG: 10 TABLET ORAL at 16:45

## 2021-07-16 RX ADMIN — INSULIN LISPRO 7 UNITS: 100 INJECTION, SOLUTION INTRAVENOUS; SUBCUTANEOUS at 21:22

## 2021-07-16 RX ADMIN — DEXAMETHASONE SODIUM PHOSPHATE 8 MG: 4 INJECTION, SOLUTION INTRA-ARTICULAR; INTRALESIONAL; INTRAMUSCULAR; INTRAVENOUS; SOFT TISSUE at 09:46

## 2021-07-16 RX ADMIN — LIDOCAINE HYDROCHLORIDE 100 MG: 20 INJECTION, SOLUTION EPIDURAL; INFILTRATION; INTRACAUDAL at 09:36

## 2021-07-16 RX ADMIN — OXYCODONE HYDROCHLORIDE 10 MG: 5 SOLUTION ORAL at 11:35

## 2021-07-16 RX ADMIN — ONDANSETRON 4 MG: 2 INJECTION INTRAMUSCULAR; INTRAVENOUS at 10:49

## 2021-07-16 RX ADMIN — ROCURONIUM BROMIDE 40 MG: 10 INJECTION, SOLUTION INTRAVENOUS at 09:38

## 2021-07-16 RX ADMIN — SUGAMMADEX 200 MG: 100 INJECTION, SOLUTION INTRAVENOUS at 09:46

## 2021-07-16 RX ADMIN — SODIUM CHLORIDE, POTASSIUM CHLORIDE, SODIUM LACTATE AND CALCIUM CHLORIDE: 600; 310; 30; 20 INJECTION, SOLUTION INTRAVENOUS at 08:35

## 2021-07-16 RX ADMIN — MIDAZOLAM HYDROCHLORIDE 2 MG: 1 INJECTION, SOLUTION INTRAMUSCULAR; INTRAVENOUS at 09:36

## 2021-07-16 RX ADMIN — EPHEDRINE SULFATE 10 MG: 50 INJECTION, SOLUTION INTRAVENOUS at 11:01

## 2021-07-16 RX ADMIN — DEXAMETHASONE SODIUM PHOSPHATE 4 MG: 4 INJECTION, SOLUTION INTRA-ARTICULAR; INTRALESIONAL; INTRAMUSCULAR; INTRAVENOUS; SOFT TISSUE at 16:47

## 2021-07-16 RX ADMIN — DOCUSATE SODIUM 100 MG: 100 CAPSULE ORAL at 17:13

## 2021-07-16 RX ADMIN — HYDROMORPHONE HYDROCHLORIDE 0.5 MG: 2 INJECTION, SOLUTION INTRAMUSCULAR; INTRAVENOUS; SUBCUTANEOUS at 10:27

## 2021-07-16 RX ADMIN — LISINOPRIL 10 MG: 10 TABLET ORAL at 16:48

## 2021-07-16 RX ADMIN — PROPOFOL 50 MG: 10 INJECTION, EMULSION INTRAVENOUS at 09:39

## 2021-07-16 RX ADMIN — PROPOFOL 150 MG: 10 INJECTION, EMULSION INTRAVENOUS at 09:36

## 2021-07-16 RX ADMIN — ANTACID TABLETS 500 MG: 500 TABLET, CHEWABLE ORAL at 17:13

## 2021-07-16 RX ADMIN — HYDROMORPHONE HYDROCHLORIDE 0.5 MG: 2 INJECTION, SOLUTION INTRAMUSCULAR; INTRAVENOUS; SUBCUTANEOUS at 09:36

## 2021-07-16 RX ADMIN — BENZOCAINE AND MENTHOL 1 LOZENGE: 15; 3.6 LOZENGE ORAL at 23:42

## 2021-07-16 RX ADMIN — PHENYLEPHRINE HYDROCHLORIDE 50 MCG/MIN: 10 INJECTION INTRAVENOUS at 09:46

## 2021-07-16 RX ADMIN — ACETAMINOPHEN 1000 MG: 500 TABLET ORAL at 08:16

## 2021-07-16 RX ADMIN — POVIDONE IODINE 15 ML: 100 SOLUTION TOPICAL at 08:16

## 2021-07-16 RX ADMIN — ACETAMINOPHEN 1000 MG: 500 TABLET ORAL at 23:42

## 2021-07-16 RX ADMIN — NADOLOL 20 MG: 20 TABLET ORAL at 17:16

## 2021-07-16 RX ADMIN — MAGNESIUM SULFATE HEPTAHYDRATE 3 G: 500 INJECTION, SOLUTION INTRAMUSCULAR; INTRAVENOUS at 10:16

## 2021-07-16 RX ADMIN — ACETAMINOPHEN 1000 MG: 500 TABLET ORAL at 16:45

## 2021-07-16 RX ADMIN — INSULIN GLARGINE 20 UNITS: 100 INJECTION, SOLUTION SUBCUTANEOUS at 21:22

## 2021-07-16 RX ADMIN — INSULIN LISPRO 4 UNITS: 100 INJECTION, SOLUTION INTRAVENOUS; SUBCUTANEOUS at 17:13

## 2021-07-16 ASSESSMENT — PAIN SCALES - GENERAL: PAIN_LEVEL: 3

## 2021-07-16 ASSESSMENT — PATIENT HEALTH QUESTIONNAIRE - PHQ9
2. FEELING DOWN, DEPRESSED, IRRITABLE, OR HOPELESS: NOT AT ALL
SUM OF ALL RESPONSES TO PHQ9 QUESTIONS 1 AND 2: 0
1. LITTLE INTEREST OR PLEASURE IN DOING THINGS: NOT AT ALL

## 2021-07-16 NOTE — PROGRESS NOTES
1402 Received report from PACU NEY Patel, POC discussed.    1430 Received patient via bed, A&Ox4, RA, dressing on neck is CD&I with Aspen collar

## 2021-07-16 NOTE — PROGRESS NOTES
4 Eyes Skin Assessment Completed by ZE, RN and NEY Mosley.    Head WDL  Ears WDL  Nose WDL  Mouth WDL  Neck Markings from surgery  Breast/Chest WDL  Shoulder Blades WDL  Spine abrasion on right side  (R) Arm/Elbow/Hand WDL  (L) Arm/Elbow/Hand WDL  Abdomen WDL  Groin WDL  Scrotum/Coccyx/Buttocks Redness and Blanching on left buttocks  (R) Leg WDL  (L) Leg Bruising  (R) Heel/Foot/Toe Redness and Blanching on toes  (L) Heel/Foot/Toe Redness and Blanching on toes          Devices In Places Cervical Collar, , SCD      Interventions In Place N/A    Possible Skin Injury No    Pictures Uploaded Into Epic N/A  Wound Consult Placed N/A  RN Wound Prevention Protocol Ordered No

## 2021-07-16 NOTE — OP REPORT
DATE OF SERVICE:  07/16/2021     PREOPERATIVE DIAGNOSES:  1.  Spinal cord injury with complete paralysis after a fall dated 06/30/2021.    Fortunately, the patient's neurologic function has recovered.  2.  Multiple sclerosis.  3.  Severe spinal cord compression at C4-5 below a prior fusion at C3-4   performed by a different neurosurgeon years ago with significant   retrolisthesis.     POSTOPERATIVE DIAGNOSES:  1.  Spinal cord injury with complete paralysis after a fall dated 06/30/2021.    Fortunately, the patient's neurologic function has recovered.  2.  Multiple sclerosis.  3.  Severe spinal cord compression at C4-5 below a prior fusion at C3-4   performed by a different neurosurgeon years ago with significant   retrolisthesis.     PRINCIPAL PROCEDURES PERFORMED:  1.  C4-5 anterior cervical diskectomy and interbody fusion.  2.  C3-4 anterior cervical plate removal performed as a distinct procedural   service.  3.  C4 hemicorpectomy with resection of 50% of the vertebral body.  4.  C5 hemicorpectomy with 50% resection of the vertebral body with   microdissection.  5.  Placement of a 10 mm 4WEB titanium Truss interbody cage and placement of a   Zavation anterior cervical plate.     SURGEON:  Trevin Abrams MD     ASSISTANT:  Sravan Cordero MD     ANESTHESIA:  The procedure was performed under general anesthesia.     ANESTHESIOLOGIST:  Sebastien Pan MD     COMPLICATIONS:  There were no complications.     FINDINGS:  Include evidence of significant spinal canal compromise and stable   SSEPs, EMGs, MEPs and recurrent laryngeal nerve monitoring with nice placement   of hardware.     IV FLUIDS:  Please see the anesthesia record.     URINE OUTPUT:  Please see the anesthesia record.     ESTIMATED BLOOD LOSS:  Please see the anesthesia record.     DISPOSITION:  The patient will be extubated and brought to the recovery room.     CLINICAL HISTORY:  The patient is a 66-year-old male who presents with severe    spinal cord compression and complete paralysis after a ground level fall on   06/30/2021.  The patient seemed to recover quickly after that, but his MRI   showed severe spinal cord compression.  The patient opted to be discharged   home to represent for surgery.  Risks, benefits and options were discussed.    The patient signed a written informed consent.     DESCRIPTION OF PROCEDURE:  The patient was brought to the operating room and   placed under general anesthesia.  The neck was prepped and draped in the usual   sterile fashion.  Needle electrodes have been placed for baseline   neuromonitoring signals.  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    Perioperative IV antibiotics were administered and a timeout was performed.    Local anesthetic was infiltrated in the old incision.  I opened up the old   incision.  I carried the incision down to the platysma.  The platysma was   incised.  Blunt dissection was used to create a plane between the carotid   sheath laterally and the trachea and esophagus medially.  The longus colli   muscles were undermined and radiolucent self-retaining retractors were placed.    Intraoperative fluoroscopy was used to confirm the correct level and the old   plate was identified.  This appeared to be an Aesculap plate.  The locking   mechanism was disengaged.  I removed all 4 screws and I removed the anterior   cervical plate.  The arthrodesis was solid.  Aurora pins were placed for nice   distraction.  I repeated the neuromonitoring signals to make sure that there   was nothing untoward during the distraction.  An AM12 drill bit was used to   perform the diskectomy and then I performed a hemicorpectomy at C4 and a   hemicorpectomy at C5.  This was required given the severe spinal cord   compression and the need to decompress the patient adequately to hopefully   avoid a posterior surgery.  Once I felt that the thecal sac was pulsatile,   bilateral foraminotomies were  performed.  The microscope had been brought into   view and Kerrison 2 punches were used to complete the hemicorpectomy at C4   and at C5.  Hemicorpectomy created a large gap, so a 10 mm 4WEB titanium Truss   interbody cage was used to fill that gap and this was filled with i-FACTOR   for a C4-5 fusion.  Next, I secured a Zavation anterior cervical plate and I   placed 16 mm screws x4.  The locking mechanism was engaged.  Intraoperative   fluoroscopy demonstrated nice placement of the hardware.  Perfect hemostasis   was achieved.  Prior to closure, instruments, sponge and needle counts were   correct.  The wound was closed in anatomic layers and a sterile dressing was   applied.        ______________________________  MD DAISY SLATER/BETHANIE/JAE    DD:  07/16/2021 11:45  DT:  07/16/2021 12:17    Job#:  [id number]

## 2021-07-16 NOTE — PROGRESS NOTES
Patient in pre-op, assessment completed, patient and wife Ghada  updated on plan of care, all questions answered, no further needs at this time, call light within reach.

## 2021-07-16 NOTE — ANESTHESIA PROCEDURE NOTES
Airway    Date/Time: 7/16/2021 9:39 AM  Performed by: Sebastien Pan M.D.  Authorized by: Sebastien Pan M.D.     Location:  OR  Urgency:  Elective  Difficult Airway: No    Indications for Airway Management:  Anesthesia      Spontaneous Ventilation: absent    Sedation Level:  Deep  Preoxygenated: Yes    Patient Position:  Sniffing  Mask Difficulty Assessment:  1 - vent by mask  Final Airway Type:  Endotracheal airway  Final Endotracheal Airway:  ETT and NIM tube  Cuffed: Yes    Technique Used for Successful ETT Placement:  Video laryngoscopy    Insertion Site:  Oral  Blade Type:  Roxane  Laryngoscope Blade/Videolaryngoscope Blade Size:  3  ETT Size (mm):  7.0  Measured from:  Lips  ETT to Lips (cm):  24  Placement Verified by: auscultation and capnometry    Cormack-Lehane Classification:  Grade I - full view of glottis  Number of Attempts at Approach:  2   Straight to glidescope as patient wearing c-spine collar due to instability  C-spine manipulation minimized during intubation  Patient's vocal cords weren't fully open during first laryngoscopy, so I waited for paralytic to take better effect  Atraumatic intubation on second attempt

## 2021-07-16 NOTE — ANESTHESIA TIME REPORT
Anesthesia Start and Stop Event Times     Date Time Event    7/16/2021 0727 Ready for Procedure     0932 Anesthesia Start     1125 Anesthesia Stop        Responsible Staff  07/16/21    Name Role Begin End    Sebastien Pan M.D. Anesth 0932 1125        Preop Diagnosis (Free Text):  Pre-op Diagnosis     CERVICAL HNP W/ MYELOPATHY        Preop Diagnosis (Codes):    Post op Diagnosis  Cervical spinal stenosis  severe spinal stenosis with retrolisthesis, C4-C5    Premium Reason  Non-Premium    Comments:

## 2021-07-16 NOTE — OR SURGEON
Immediate Post OP Note    PreOp Diagnosis: C4,5 severe stenosis, unstable C4,5 retrolisthesis, spinal cord injury after a fall 6/3021      PostOp Diagnosis: same      Procedure(s):  DISCECTOMY, SPINE, CERVICAL, ANTERIOR APPROACH, WITH FUSION - C4-5 WITH PLATE - Wound Class: Clean  REMOVAL, HARDWARE - PLATE C3-4 - Wound Class: Clean    Surgeon(s):  HEATHER Bardales M.D.    Anesthesiologist/Type of Anesthesia:  Anesthesiologist: Sebastien Pan M.D./General    Surgical Staff:  Circulator: Deborah Leigh R.N.  Scrub Person: Tawny Schwarz  Radiology Technologist: Kevin Leone    Specimens removed if any:  * No specimens in log *    Estimated Blood Loss: minimal    Findings: severe stenosis relieved, stable ssep's, emg's, mep's, rln monitoring    Complications: none        7/16/2021 11:25 AM Trevin Abrams M.D.

## 2021-07-16 NOTE — ANESTHESIA POSTPROCEDURE EVALUATION
Patient: Randall Worrell    Procedure Summary     Date: 07/16/21 Room / Location: [facility] OR 08 / SURGERY [facility]    Anesthesia Start: 0932 Anesthesia Stop: 1125    Procedures:       DISCECTOMY, SPINE, CERVICAL, ANTERIOR APPROACH, WITH FUSION - C4-5 WITH PLATE (N/A Spine Cervical)      REMOVAL, HARDWARE - PLATE C3-4 (N/A Spine Cervical) Diagnosis: (CERVICAL HNP W/ MYELOPATHY)    Surgeons: Trevin Abrams M.D. Responsible Provider: Sebastien Pan M.D.    Anesthesia Type: general ASA Status: 2          Final Anesthesia Type: general  Last vitals  BP   Blood Pressure: 154/74    Temp   36.1 °C (97 °F)    Pulse   (!) 52   Resp   14    SpO2   96 %      Anesthesia Post Evaluation    Patient location during evaluation: PACU  Patient participation: complete - patient participated  Level of consciousness: awake and alert  Pain score: 3    Airway patency: patent  Anesthetic complications: no  Cardiovascular status: hemodynamically stable  Respiratory status: acceptable  Hydration status: euvolemic    PONV: none          No complications documented.     Nurse Pain Score: 0 (NPRS)

## 2021-07-16 NOTE — ANESTHESIA PREPROCEDURE EVALUATION
Relapsing/remitting MS, HTN, insulin dependent DM type 2. Hx of afib on 1-2 occasions (ASA only and nadolol), some PACs per patient. Denies: MI/CHF/smoking/CVA/CKD      Physical Exam    Airway   Mallampati: II  TM distance: >3 FB  Neck ROM: full    Comments: c-spine collar in place   Cardiovascular - normal exam  Rhythm: regular  Rate: normal  (-) murmur     Dental - normal exam           Pulmonary - normal exam  Breath sounds clear to auscultation     Abdominal    Neurological - normal exam               Anesthesia Plan    ASA 2       Plan - general       Airway plan will be ETT          Induction: intravenous    Postoperative Plan: Postoperative administration of opioids is intended.    Pertinent diagnostic labs and testing reviewed    Informed Consent:    Anesthetic plan and risks discussed with patient.    Use of blood products discussed with: patient whom consented to blood products.

## 2021-07-17 VITALS
HEIGHT: 73 IN | DIASTOLIC BLOOD PRESSURE: 68 MMHG | BODY MASS INDEX: 28.49 KG/M2 | HEART RATE: 68 BPM | SYSTOLIC BLOOD PRESSURE: 109 MMHG | TEMPERATURE: 98.1 F | OXYGEN SATURATION: 98 % | RESPIRATION RATE: 17 BRPM | WEIGHT: 214.95 LBS

## 2021-07-17 LAB
GLUCOSE BLD-MCNC: 201 MG/DL (ref 65–99)
GLUCOSE BLD-MCNC: 218 MG/DL (ref 65–99)
GLUCOSE BLD-MCNC: 278 MG/DL (ref 65–99)

## 2021-07-17 PROCEDURE — 700111 HCHG RX REV CODE 636 W/ 250 OVERRIDE (IP): Performed by: NEUROLOGICAL SURGERY

## 2021-07-17 PROCEDURE — 82962 GLUCOSE BLOOD TEST: CPT

## 2021-07-17 PROCEDURE — A9270 NON-COVERED ITEM OR SERVICE: HCPCS | Performed by: NEUROLOGICAL SURGERY

## 2021-07-17 PROCEDURE — 700102 HCHG RX REV CODE 250 W/ 637 OVERRIDE(OP): Performed by: STUDENT IN AN ORGANIZED HEALTH CARE EDUCATION/TRAINING PROGRAM

## 2021-07-17 PROCEDURE — 700102 HCHG RX REV CODE 250 W/ 637 OVERRIDE(OP): Performed by: NEUROLOGICAL SURGERY

## 2021-07-17 PROCEDURE — 97165 OT EVAL LOW COMPLEX 30 MIN: CPT

## 2021-07-17 PROCEDURE — 97162 PT EVAL MOD COMPLEX 30 MIN: CPT

## 2021-07-17 RX ORDER — METHYLPREDNISOLONE ACETATE 40 MG/ML
40 INJECTION, SUSPENSION INTRA-ARTICULAR; INTRALESIONAL; INTRAMUSCULAR; SOFT TISSUE ONCE
Status: COMPLETED | OUTPATIENT
Start: 2021-07-17 | End: 2021-07-17

## 2021-07-17 RX ORDER — METHYLPREDNISOLONE ACETATE 40 MG/ML
40 INJECTION, SUSPENSION INTRA-ARTICULAR; INTRALESIONAL; INTRAMUSCULAR; SOFT TISSUE ONCE
Status: SHIPPED | OUTPATIENT
Start: 2021-07-17 | End: 2021-07-20

## 2021-07-17 RX ADMIN — INSULIN LISPRO 4 UNITS: 100 INJECTION, SOLUTION INTRAVENOUS; SUBCUTANEOUS at 10:49

## 2021-07-17 RX ADMIN — DOCUSATE SODIUM 100 MG: 100 CAPSULE ORAL at 05:42

## 2021-07-17 RX ADMIN — BACLOFEN 10 MG: 10 TABLET ORAL at 10:23

## 2021-07-17 RX ADMIN — DEXAMETHASONE SODIUM PHOSPHATE 4 MG: 4 INJECTION, SOLUTION INTRA-ARTICULAR; INTRALESIONAL; INTRAMUSCULAR; INTRAVENOUS; SOFT TISSUE at 05:47

## 2021-07-17 RX ADMIN — INSULIN LISPRO 3 UNITS: 100 INJECTION, SOLUTION INTRAVENOUS; SUBCUTANEOUS at 06:34

## 2021-07-17 RX ADMIN — LEVOTHYROXINE SODIUM 100 MCG: 0.1 TABLET ORAL at 05:42

## 2021-07-17 RX ADMIN — BACLOFEN 10 MG: 10 TABLET ORAL at 05:42

## 2021-07-17 RX ADMIN — ACETAMINOPHEN 1000 MG: 500 TABLET ORAL at 05:42

## 2021-07-17 RX ADMIN — Medication 5000 UNITS: at 05:42

## 2021-07-17 RX ADMIN — ACETAMINOPHEN 1000 MG: 500 TABLET ORAL at 10:23

## 2021-07-17 RX ADMIN — NADOLOL 20 MG: 20 TABLET ORAL at 05:41

## 2021-07-17 RX ADMIN — LISINOPRIL 10 MG: 10 TABLET ORAL at 05:42

## 2021-07-17 RX ADMIN — METHYLPREDNISOLONE ACETATE 40 MG: 40 INJECTION, SUSPENSION INTRA-ARTICULAR; INTRALESIONAL; INTRAMUSCULAR; SOFT TISSUE at 10:23

## 2021-07-17 RX ADMIN — ANTACID TABLETS 500 MG: 500 TABLET, CHEWABLE ORAL at 05:42

## 2021-07-17 ASSESSMENT — COGNITIVE AND FUNCTIONAL STATUS - GENERAL
DAILY ACTIVITIY SCORE: 22
SUGGESTED CMS G CODE MODIFIER DAILY ACTIVITY: CI
DRESSING REGULAR LOWER BODY CLOTHING: A LITTLE
DRESSING REGULAR UPPER BODY CLOTHING: A LITTLE
SUGGESTED CMS G CODE MODIFIER DAILY ACTIVITY: CJ
MOBILITY SCORE: 24
CLIMB 3 TO 5 STEPS WITH RAILING: A LITTLE
MOBILITY SCORE: 23
SUGGESTED CMS G CODE MODIFIER MOBILITY: CI
DAILY ACTIVITIY SCORE: 23
SUGGESTED CMS G CODE MODIFIER MOBILITY: CH
HELP NEEDED FOR BATHING: A LITTLE

## 2021-07-17 ASSESSMENT — GAIT ASSESSMENTS
DEVIATION: DECREASED BASE OF SUPPORT
DISTANCE (FEET): 50
DISTANCE (FEET): 250
GAIT LEVEL OF ASSIST: SUPERVISED

## 2021-07-17 ASSESSMENT — PAIN DESCRIPTION - PAIN TYPE
TYPE: ACUTE PAIN
TYPE: ACUTE PAIN

## 2021-07-17 ASSESSMENT — ACTIVITIES OF DAILY LIVING (ADL): TOILETING: INDEPENDENT

## 2021-07-17 NOTE — PROGRESS NOTES
c collar in place.  Aaox4.  IV dc.  Instructions and rx info given. No personal meds in pharm.  Able to wait for ride.

## 2021-07-17 NOTE — DISCHARGE SUMMARY
Discharge Summary    CHIEF COMPLAINT ON ADMISSION  No chief complaint on file.      Reason for Admission  CERVICAL HNP W/ MYELOPATHY     Admission Date  7/16/2021    CODE STATUS  Full Code    HPI & HOSPITAL COURSE  This is a 66 y.o. male here with spinal cord compression, spinal cord injury, c4,5 stenosis/retrolisthesis   No notes on file    Therefore, he is discharged in good and stable condition to home with close outpatient follow-up.    The patient recovered much more quickly than anticipated on admission.    Discharge Date  7/17/2021    FOLLOW UP ITEMS POST DISCHARGE  See me in two weeks    DISCHARGE DIAGNOSES  Active Problems:    * No active hospital problems. *  Resolved Problems:    * No resolved hospital problems. *      FOLLOW UP  No future appointments.  Trevin Abrams M.D.  [address]  [place] [address] [phone]    Call in 2 weeks  For wound re-check      MEDICATIONS ON DISCHARGE     Medication List      CONTINUE taking these medications      Instructions   baclofen 10 MG Tabs  Commonly known as: LIORESAL   Take 10 mg by mouth 3 times a day.  Dose: 10 mg     BETASERON SC   Inject 1 mL under the skin every 48 hours. Every other day administration  Dose: 1 mL     HumaLOG KwikPen 100 UNIT/ML Sopn injection PEN  Generic drug: insulin lispro   Inject 4-18 Units under the skin 3 times a day with meals.  Dose: 4-18 Units     ibuprofen 200 MG Tabs  Commonly known as: MOTRIN   Take 200 mg by mouth every 48 hours as needed.  Dose: 200 mg     Lantus 100 UNIT/ML Soln  Generic drug: insulin glargine   Inject 20 Units under the skin at bedtime.  Dose: 20 Units     lisinopril 20 MG Tabs  Commonly known as: PRINIVIL   Take 10 mg by mouth every day.  Dose: 10 mg     nadolol 20 MG Tabs  Commonly known as: CORGARD   Take 20 mg by mouth 2 times a day.  Dose: 20 mg     Synthroid 100 MCG Tabs  Generic drug: levothyroxine   Take 100 mcg by mouth every day. Indications: Underactive Thyroid  Dose: 100 mcg      VITAMIN D PO   Take 4,000 Units by mouth every day.  Dose: 4,000 Units        STOP taking these medications    aspirin 81 MG tablet            Allergies  Allergies   Allergen Reactions   • Penicillins Rash   • Atorvastatin Calcium      Other reaction(s): Affected heart rate and urine   • Codeine      Other reaction(s): Nausea   • Niacin      Other reaction(s): Glucose instability   • Simvastatin      Other reaction(s): Affected heart rate and urine   • Sulfamethoxazole Rash     Generalized rash    • Valium Unspecified     Hallucination       DIET  Orders Placed This Encounter   Procedures   • Diet Order Diet: Level 6 - Soft and Bite Sized (diabetic); Liquid level: Level 0 - Thin     Standing Status:   Standing     Number of Occurrences:   1     Order Specific Question:   Diet:     Answer:   Level 6 - Soft and Bite Sized [23]     Comments:   diabetic     Order Specific Question:   Liquid level     Answer:   Level 0 - Thin       ACTIVITY  As tolerated.  Weight bearing as tolerated    CONSULTATIONS  none    PROCEDURES  C4,5 ACDF + plate    LABORATORY  Lab Results   Component Value Date    SODIUM 141 06/30/2021    POTASSIUM 4.4 06/30/2021    CHLORIDE 106 06/30/2021    CO2 24 06/30/2021    GLUCOSE 148 (H) 06/30/2021    BUN 25 (H) 06/30/2021    CREATININE 0.76 06/30/2021        Lab Results   Component Value Date    WBC 7.4 06/30/2021    HEMOGLOBIN 14.6 06/30/2021    HEMATOCRIT 43.7 06/30/2021    PLATELETCT 140 (L) 06/30/2021        Total time of the discharge process exceeds 30 minutes.

## 2021-07-17 NOTE — THERAPY
Occupational Therapy   Initial Evaluation     Patient Name: Randall Worrell  Age:  66 y.o., Sex:  male  Medical Record #: [id number]  Today's Date: 7/17/2021     Precautions  Precautions: Fall Risk, Spinal / Back Precautions , Cervical Collar    Comments: cervical colllar AAT; SCI 2/2 GLF 6/30     Assessment  Patient is 66 y.o. male presents to skilled OT services following s/p C4-5 fusion, removal of HWR at C3-4. Pt was able to perform basic self care tasks, functional mobility and t/f's with supervision post initial education and training in c-spine precautions and brace management. Will have assist from spouse upon d/c as needed.       Plan    Recommend Occupational Therapy for Evaluation only     DC Equipment Recommendations: None  Discharge Recommendations: Anticipate that the patient will have no further occupational therapy needs after discharge from the hospital      Objective       07/17/21 0816   Prior Living Situation   Prior Services Home-Independent   Housing / Facility 1 Story House   Steps Into Home 0   Bathroom Set up Walk In Shower;Grab Bars;Shower Chair   Equipment Owned Single Point Cane;Tub / Shower Seat;Grab Bar(s) In Tub / Shower;Wheelchair   Lives with - Patient's Self Care Capacity Spouse   Comments I with ADLs/IADLs baseline. Reports spouse can assist as needed for 1 week then she's getting sx but will have assist available from brother w/ IADLs   Prior Level of ADL Function   Self Feeding Independent   Grooming / Hygiene Independent   Bathing Independent   Dressing Independent   Toileting Independent   Prior Level of IADL Function   Medication Management Independent   Laundry Independent   Kitchen Mobility Independent   Finances Independent   Home Management Independent   Shopping Independent   Prior Level Of Mobility Independent Without Device in Community;Independent With Device in Community   Balance Assessment   Sitting Balance (Static) Good   Sitting Balance (Dynamic) Fair +    Standing Balance (Static) Fair   Standing Balance (Dynamic) Fair   Weight Shift Sitting Good   Weight Shift Standing Good   Comments w/o AD, no lob noted   Bed Mobility    Supine to Sit Supervised   Sit to Supine   (left up with pT)   Scooting Supervised   Rolling Supervised   Comments flat hob and able to demonstrate log rolling adequately. Has access to recliner   ADL Assessment   Eating Independent   Grooming Supervision;Standing   Bathing   (discussed home s/u, AE and modifications)   Upper Body Dressing Supervision   Lower Body Dressing Supervision  (cross leg method)   Toileting Supervision   Functional Mobility   Sit to Stand Supervised   Bed, Chair, Wheelchair Transfer Supervised   Toilet Transfers Supervised   Transfer Method Stand Pivot   Mobility within room    Comments w/o AD, no lob noted   Anticipated Discharge Equipment and Recommendations   DC Equipment Recommendations None

## 2021-07-17 NOTE — THERAPY
Physical Therapy   Initial Evaluation     Patient Name: Randall Worrell  Age:  66 y.o., Sex:  male  Medical Record #: [id number]  Today's Date: 7/17/2021     Precautions: (P) Fall Risk, Spinal / Back Precautions , Cervical Collar      Assessment  Pt presents with impaired activity tolerance, ROM and sensation s/p C4-5 fusion, removal of HWR at C3-4; pt is most limited by premorbid parasthesia, reports clothing sensitivity along C5 right dermatome that has near resolved from preop; no issues noted with LE sensation/strength, does have MS but denies current flare and has dealt with symptoms well lifelong. Pt demonstrated functional mobility required to return home when medically appropriate to do so. No further acute PT needs and pt declines outpatient PT follow up at this time. Please reconsult should condition change.     Plan    Recommend Physical Therapy for Evaluation only     DC Equipment Recommendations: None  Discharge Recommendations: Recommend outpatient physical therapy services to address higher level deficits (pt declines)       Abridged Subjective/Objective     07/17/21 0919   Prior Living Situation   Prior Services Home-Independent   Housing / Facility 1 Story House   Steps Into Home 2   Steps In Home 0   Rail Left Rail  (Steps into Home)   Bathroom Set up Walk In Shower;Grab Bars   Equipment Owned Single Point Cane   Lives with - Patient's Self Care Capacity Spouse   Comments retired; wife 24/7 then she is getting sx but then his brother will be available to assist;    Prior Level of Functional Mobility   Bed Mobility Independent   Transfer Status Independent   Ambulation Independent   Distance Ambulation (Feet)   (to tolerance)   Assistive Devices Used None   Stairs Independent   Cognition    Cognition / Consciousness WDL   Level of Consciousness Alert   Comments pleasant and cooperative; however restitant to new learning, 'i know all the PT i need to do' ; 2006 c/spine sx    Passive ROM Lower  Body   Passive ROM Lower Body WDL   Sensation Lower Body   Lower Extremity Sensation   WDL   Balance Assessment   Sitting Balance (Static) Good   Sitting Balance (Dynamic) Fair +   Standing Balance (Static) Fair   Standing Balance (Dynamic) Fair   Weight Shift Sitting Good   Weight Shift Standing Good   Comments no UE support in sitting/standing; no loss of balance in moving enviornment, needs encouragement for arm swing    Gait Analysis   Gait Level Of Assist Supervised  (SBa 2/2 cues for arm swing )   Assistive Device None   Distance (Feet) 250   # of Times Distance was Traveled 2   Deviation Decreased Base Of Support   # of Stairs Climbed 2   Level of Assist with Stairs Supervised  (SBA 2/2 VC for sequencing/sensation )   Weight Bearing Status No restrictions   Vision Deficits Impacting Mobility denies    Comments distancel imited by thearpist due to pod 1; pt denies issues throughotu    Bed Mobility    Supine to Sit   (Nt, up with OT pre; denies concerns)   Comments has recliner chair he is going to sleep in; discussed side and back sleeping    Functional Mobility   Sit to Stand Supervised  (no device)   Bed, Chair, Wheelchair Transfer Supervised  (no device)   Education Group   Cervical Precautions Patient Response Patient;Acceptance;Explanation;Demonstration;Verbal Demonstration;Action Demonstration;Handout   Role of Physical Therapist Patient Response Patient;Acceptance;Explanation;Demonstration;Verbal Demonstration;Action Demonstration   Stair Training Patient Response Patient;Acceptance;Demonstration;Explanation;Action Demonstration;Verbal Demonstration   Brace Wear & Care Patient Response Patient;Acceptance;Explanation;Demonstration;Handout;Verbal Demonstration;Action Demonstration   Additional Comments ice vs heat; protein and water in diet

## 2021-07-17 NOTE — CARE PLAN
Problem: Knowledge Deficit - Standard  Goal: Patient and family/care givers will demonstrate understanding of plan of care, disease process/condition, diagnostic tests and medications  Outcome: Progressing  Note: Discussed POC with patient; pain management, educated patient about spinal precautions/log rolling and encouraging patient to maintain them, increasing mobilization, encouraging use of Aspen cervical collar 24/7 as directed by MD, working with PT/OT, remove fuse of IS 10x every hour while awake, and discharge planning. Patient verbalized understanding and is agreeable to POC. Encouraging pt to voice questions and concerns. Oriented pt to use of call light. Patient is capable of making needs known.     Problem: Pain - Standard  Goal: Alleviation of pain or a reduction in pain to the patient’s comfort goal  Outcome: Progressing  Note: Administering pain mediations as ordered (see MAR).       The patient is Stable - Low risk of patient condition declining or worsening    Shift Goals  Clinical Goals:  (pain management)  Patient Goals:  (rest)

## 2021-07-17 NOTE — PROGRESS NOTES
Neurosurgery Progress Note    Subjective:  Preop tingling, numbness i arms is improved.  No complaints of dyspnea or dysphagia.     Exam:  Awake, alert, oriented x 3, incision is cdi  Motor is 5/5  Sensory is intact C5-T1, L2-S1  Gait is stable  Voice is strong    BP  Min: 123/64  Max: 154/74  Pulse  Av.9  Min: 52  Max: 70  Resp  Av.8  Min: 10  Max: 18  Temp  Av.2 °C (97.2 °F)  Min: 36 °C (96.8 °F)  Max: 36.4 °C (97.6 °F)  SpO2  Av %  Min: 93 %  Max: 100 %    No data recorded                      Intake/Output                       21 - 21 - 2159     5417-4155 1463-7770 Total 2189-9822 0524-3033 Total                 Intake    I.V.  1100  -- 1100  --  -- --    Volume (mL) (lactated ringers infusion) 1100 -- 1100 -- -- --    IV Piggyback  100  -- 100  --  -- --    Volume (mL) (ceFAZolin in dextrose (ANCEF) IVPB premix 2 g) 100 -- 100 -- -- --    Total Intake 1200 -- 1200 -- -- --       Output    Urine  --  -- --  --  -- --    Number of Times Voided 2 x -- 2 x -- -- --    Blood  50  -- 50  --  -- --    Est. Blood Loss 50 -- 50 -- -- --    Total Output 50 -- 50 -- -- --       Net I/O     1150 -- 1150 -- -- --            Intake/Output Summary (Last 24 hours) at 2021 0656  Last data filed at 2021 1713  Gross per 24 hour   Intake 1200 ml   Output 50 ml   Net 1150 ml            • baclofen  10 mg TID   • insulin glargine  20 Units QHS   • levothyroxine  100 mcg DAILY   • lisinopril  10 mg DAILY   • nadolol  20 mg BID   • Pharmacy Consult Request  1 Each PHARMACY TO DOSE   • MD ALERT...DO NOT ADMINISTER NSAIDS or ASPIRIN unless ORDERED By Neurosurgery  1 Each PRN   • docusate sodium  100 mg BID   • senna-docusate  1 tablet Nightly   • senna-docusate  1 tablet Q24HRS PRN   • polyethylene glycol/lytes  1 Packet BID PRN   • magnesium hydroxide  30 mL QDAY PRN   • bisacodyl  10 mg Q24HRS PRN   • fleet  1 Each Once PRN   • NS   Continuous   • acetaminophen   1,000 mg Q6HRS    Followed by   • [START ON 7/21/2021] acetaminophen  1,000 mg Q6HRS PRN   • oxyCODONE immediate-release  2.5 mg Q3HRS PRN    Or   • oxyCODONE immediate-release  5 mg Q3HRS PRN    Or   • HYDROmorphone  0.25 mg Q3HRS PRN   • diphenhydrAMINE  25 mg Q6HRS PRN    Or   • diphenhydrAMINE  25 mg Q6HRS PRN   • ondansetron  4 mg Q4HRS PRN   • ondansetron  4 mg Q4HRS PRN   • promethazine  12.5-25 mg Q4HRS PRN   • promethazine  12.5-25 mg Q4HRS PRN   • prochlorperazine  5-10 mg Q4HRS PRN   • diazePAM  5 mg Q6HRS PRN   • ALPRAZolam  0.25 mg BID PRN   • benzocaine-menthol  1 Lozenge Q2HRS PRN   • calcium carbonate  500 mg BID   • vitamin D  5,000 Units DAILY   • insulin lispro  3-14 Units 4X/DAY ACHS    And   • glucose  16 g Q15 MIN PRN    And   • dextrose 50%  50 mL Q15 MIN PRN       Assessment and Plan:  Hospital day #2  POD #1  Prophylactic anticoagulation: no         Start date/time: tbd    Patient looks great.    DC instructions given.    RTC in two weeks.    Patient understands that steroids will cause his sugars to go up.  He says he can manage that.    Patient is very grateful.

## 2021-07-17 NOTE — PROGRESS NOTES
1203 Patient transported down to discharge lounge via wheelchair by CNA. Patient states all belongings in possession; including, phone, , extra aspen cervical collar, and other miscellaneous items. Apsen cervical collar in place. Discharge RN to remove IV, review discharge instruction and medication information with patient.

## 2021-07-17 NOTE — DISCHARGE INSTRUCTIONS
Discharge Instructions    Discharged to home by car with relative. Discharged via wheelchair, hospital escort: Yes.  Special equipment needed: C-Collar    Be sure to schedule a follow-up appointment with your primary care doctor or any specialists as instructed.     Discharge Plan:        I understand that a diet low in cholesterol, fat, and sodium is recommended for good health. Unless I have been given specific instructions below for another diet, I accept this instruction as my diet prescription.   Other diet: regular    Special Instructions: post op info    · Is patient discharged on Warfarin / Coumadin?   No     Depression / Suicide Risk    As you are discharged from this [facility] facility, it is important to learn how to keep safe from harming yourself.    Recognize the warning signs:  · Abrupt changes in personality, positive or negative- including increase in energy   · Giving away possessions  · Change in eating patterns- significant weight changes-  positive or negative  · Change in sleeping patterns- unable to sleep or sleeping all the time   · Unwillingness or inability to communicate  · Depression  · Unusual sadness, discouragement and loneliness  · Talk of wanting to die  · Neglect of personal appearance   · Rebelliousness- reckless behavior  · Withdrawal from people/activities they love  · Confusion- inability to concentrate     If you or a loved one observes any of these behaviors or has concerns about self-harm, here's what you can do:  · Talk about it- your feelings and reasons for harming yourself  · Remove any means that you might use to hurt yourself (examples: pills, rope, extension cords, firearm)  · Get professional help from the community (Mental Health, Substance Abuse, psychological counseling)  · Do not be alone:Call your Safe Contact- someone whom you trust who will be there for you.  · Call your local CRISIS HOTLINE 059-3981 or 997-099-1455  · Call your local Children's Mobile  Crisis Response Team [place] (234) 321-0596 or www.[employer].Endoclear  · Call the toll free National Suicide Prevention Hotlines   · National Suicide Prevention Lifeline 303-155-LPZQ (3017)  · National Hope Line Network 800-SUICIDE (364-2365)    See website for dc instructions.

## 2023-05-24 ENCOUNTER — APPOINTMENT (OUTPATIENT)
Dept: ADMISSIONS | Facility: MEDICAL CENTER | Age: 68
End: 2023-05-24
Attending: NEUROLOGICAL SURGERY
Payer: COMMERCIAL

## 2023-05-26 ENCOUNTER — PRE-ADMISSION TESTING (OUTPATIENT)
Dept: ADMISSIONS | Facility: MEDICAL CENTER | Age: 68
End: 2023-05-26
Attending: NEUROLOGICAL SURGERY
Payer: COMMERCIAL

## 2023-05-26 VITALS — WEIGHT: 210 LBS | BODY MASS INDEX: 27.83 KG/M2 | HEIGHT: 73 IN

## 2023-05-26 RX ORDER — TADALAFIL 5 MG/1
5 TABLET ORAL DAILY
COMMUNITY

## 2023-05-26 RX ORDER — ASPIRIN 81 MG/1
81 TABLET ORAL DAILY
COMMUNITY

## 2023-06-01 ENCOUNTER — APPOINTMENT (OUTPATIENT)
Dept: ADMISSIONS | Facility: MEDICAL CENTER | Age: 68
End: 2023-06-01
Attending: NEUROLOGICAL SURGERY
Payer: COMMERCIAL

## 2023-06-01 DIAGNOSIS — Z01.812 PRE-OPERATIVE LABORATORY EXAMINATION: ICD-10-CM

## 2023-06-01 LAB
ERYTHROCYTE [DISTWIDTH] IN BLOOD BY AUTOMATED COUNT: 42.5 FL (ref 35.9–50)
HCT VFR BLD AUTO: 43.5 % (ref 42–52)
HGB BLD-MCNC: 14.9 G/DL (ref 14–18)
INR PPP: 1.03 (ref 0.87–1.13)
MCH RBC QN AUTO: 29.6 PG (ref 27–33)
MCHC RBC AUTO-ENTMCNC: 34.3 G/DL (ref 32.3–36.5)
MCV RBC AUTO: 86.5 FL (ref 81.4–97.8)
PLATELET # BLD AUTO: 126 K/UL (ref 164–446)
PMV BLD AUTO: 11.1 FL (ref 9–12.9)
PROTHROMBIN TIME: 13.4 SEC (ref 12–14.6)
RBC # BLD AUTO: 5.03 M/UL (ref 4.7–6.1)
WBC # BLD AUTO: 4.8 K/UL (ref 4.8–10.8)

## 2023-06-01 PROCEDURE — 85610 PROTHROMBIN TIME: CPT

## 2023-06-01 PROCEDURE — 85027 COMPLETE CBC AUTOMATED: CPT

## 2023-06-01 PROCEDURE — 36415 COLL VENOUS BLD VENIPUNCTURE: CPT

## 2023-06-02 ENCOUNTER — APPOINTMENT (OUTPATIENT)
Dept: RADIOLOGY | Facility: MEDICAL CENTER | Age: 68
End: 2023-06-02
Attending: NEUROLOGICAL SURGERY
Payer: COMMERCIAL

## 2023-06-02 ENCOUNTER — HOSPITAL ENCOUNTER (OUTPATIENT)
Facility: MEDICAL CENTER | Age: 68
End: 2023-06-02
Attending: NEUROLOGICAL SURGERY | Admitting: NEUROLOGICAL SURGERY
Payer: COMMERCIAL

## 2023-06-02 VITALS
WEIGHT: 218.92 LBS | RESPIRATION RATE: 16 BRPM | OXYGEN SATURATION: 99 % | SYSTOLIC BLOOD PRESSURE: 135 MMHG | BODY MASS INDEX: 29.01 KG/M2 | TEMPERATURE: 97 F | HEIGHT: 73 IN | HEART RATE: 49 BPM | DIASTOLIC BLOOD PRESSURE: 63 MMHG

## 2023-06-02 DIAGNOSIS — M54.9 DORSALGIA: ICD-10-CM

## 2023-06-02 PROCEDURE — 160046 HCHG PACU - 1ST 60 MINS PHASE II

## 2023-06-02 PROCEDURE — 62284 INJECTION FOR MYELOGRAM: CPT

## 2023-06-02 PROCEDURE — 700117 HCHG RX CONTRAST REV CODE 255: Performed by: NEUROLOGICAL SURGERY

## 2023-06-02 PROCEDURE — 160047 HCHG PACU  - EA ADDL 30 MINS PHASE II

## 2023-06-02 PROCEDURE — 72129 CT CHEST SPINE W/DYE: CPT

## 2023-06-02 PROCEDURE — 160002 HCHG RECOVERY MINUTES (STAT)

## 2023-06-02 RX ADMIN — IOHEXOL 15 ML: 300 INJECTION, SOLUTION INTRAVENOUS at 10:54

## 2023-06-02 NOTE — OR NURSING
Assume care for patient in pre-op. Patient allergies, surgical procedure and NPO status verified. Belongings secured in the locker. Call light within reach.

## 2023-06-02 NOTE — DISCHARGE INSTRUCTIONS
HOME CARE INSTRUCTIONS    SPECIAL INSTRUCTIONS:   Myelogram    A myelogram is an imaging test. This test checks for problems in the spinal cord and the places where nerves attach to the spinal cord (nerve roots).  A dye (contrast material) is put into your spine before the X-ray. This provides a clearer image for your doctor to see.  You may need this test if you have a spinal cord problem that cannot be diagnosed with other imaging tests. You may also have this test to check your spine after surgery.  Tell a doctor about:  Any allergies you have, especially to iodine.  All medicines you are taking, including vitamins, herbs, eye drops, creams, and over-the-counter medicines.  Any problems you or family members have had with anesthetic medicines or dye.  Any blood disorders you have.  Any surgeries you have had.  Any medical conditions you have or have had, including asthma.  Whether you are pregnant or may be pregnant.  What are the risks?  Generally, this is a safe procedure. However, problems may occur, including:  Infection.  Bleeding.  Allergic reaction to medicines or dyes.  Damage to your spinal cord or nerves.  Leaking of spinal fluid. This can cause a headache.  Damage to kidneys.  Seizures. This is rare.  What happens before the procedure?  Follow instructions from your doctor about what you cannot eat or drink. You may be asked to drink more fluids.  Ask your doctor about changing or stopping your normal medicines. This is important if you take diabetes medicines or blood thinners.  Plan to have someone take you home from the hospital or clinic.  If you will be going home right after the procedure, plan to have someone with you for 24 hours.  What happens during the procedure?  You will lie face down on a table.  Your doctor will find the best injection site on your spine. This is most often in the lower back.  This area will be washed with soap.  You will be given a medicine to numb the area (local  anesthetic).  Your doctor will place a long needle into the space around your spinal cord.  A sample of spinal fluid may be taken. This may be sent to the lab for testing.  The dye will be injected into the space around your spinal cord.  The exam table may be tilted. This helps the dye flow up or down your spine.  The X-ray will take images of your spinal cord.  A bandage (dressing) may be placed over the area where the dye was injected.  The procedure may vary among doctors and hospitals.  What can I expect after the procedure?  You may be monitored until you leave the hospital or clinic. This includes checking your blood pressure, heart rate, breathing rate, and blood oxygen level.  You may feel sore at the injection site. You may have a mild headache.  You will be told to lie flat with your head raised (elevated). This lowers the risk of a headache.  It is up to you to get the results of your procedure. Ask your doctor, or the department that is doing the procedure, when your results will be ready.  Follow these instructions at home:  Rest as told by your doctor. Lie flat with your head slightly elevated.  Do not bend, lift, or do hard work for 24-48 hours, or as told by your doctor.  Take over-the-counter and prescription medicines only as told by your doctor.  Take care of your bandage as told by your doctor.  Drink enough fluid to keep your pee (urine) pale yellow.  Bathe or shower as told by your doctor.  Contact a doctor if:  You have a fever.  You have a headache that lasts longer than 24 hours.  You feel sick to your stomach (nauseous).  You vomit.  Your neck is stiff.  Your legs feel numb.  You cannot pee.  You cannot poop (no bowel movement).  You have a rash.  You are itchy or sneezing.  Get help right away if:  You have new symptoms or your symptoms get worse.  You have a seizure.  You have trouble breathing.  Summary  A myelogram is an imaging test that checks for problems in the spinal cord and the  places where nerves attach to the spinal cord (nerve roots).  Before the procedure, follow instructions from your doctor. You will be told what not to eat or drink, or what medicines to change or stop.  After the procedure, you will be told to lie flat with your head raised (elevated). This will lower your risk of a headache.  Do not bend, lift, or do any hard work for 24-48 hours, or as told by your doctor.  Contact a doctor if you have a stiff neck or numb legs. Get help right away if your symptoms get worse, or you have a seizure or trouble breathing.

## 2023-06-02 NOTE — OR NURSING
Arrived from PACU AXO, Pt's VSS; denies N/V; states pain is at tolerable level. Dressing with scant blood  to back.     Bed rest until 12:54    D/c orders received. IV dc'd. Pt changed into clothing with assistance. Discharge reviewed, Pt and family verbalized understanding and questions answered. Patient states ready to d/c home. Pt dc'd in w/c with friend.

## 2023-06-17 ENCOUNTER — HOSPITAL ENCOUNTER (OUTPATIENT)
Dept: RADIOLOGY | Facility: MEDICAL CENTER | Age: 68
End: 2023-06-17
Attending: NEUROLOGICAL SURGERY
Payer: COMMERCIAL

## 2023-06-17 DIAGNOSIS — M54.9 DORSALGIA: ICD-10-CM

## 2023-06-17 PROCEDURE — 72146 MRI CHEST SPINE W/O DYE: CPT

## 2024-03-11 ENCOUNTER — HOSPITAL ENCOUNTER (OUTPATIENT)
Dept: RADIOLOGY | Facility: MEDICAL CENTER | Age: 69
End: 2024-03-11
Payer: COMMERCIAL

## 2024-07-25 ENCOUNTER — OFFICE VISIT (OUTPATIENT)
Dept: NEUROLOGY | Facility: MEDICAL CENTER | Age: 69
End: 2024-07-25
Attending: PSYCHIATRY & NEUROLOGY
Payer: COMMERCIAL

## 2024-07-25 VITALS
SYSTOLIC BLOOD PRESSURE: 132 MMHG | TEMPERATURE: 98.1 F | HEART RATE: 56 BPM | DIASTOLIC BLOOD PRESSURE: 76 MMHG | HEIGHT: 73 IN | WEIGHT: 225.97 LBS | OXYGEN SATURATION: 99 % | BODY MASS INDEX: 29.95 KG/M2

## 2024-07-25 DIAGNOSIS — G35 RELAPSING REMITTING MULTIPLE SCLEROSIS (HCC): Primary | ICD-10-CM

## 2024-07-25 PROCEDURE — 3078F DIAST BP <80 MM HG: CPT | Performed by: PSYCHIATRY & NEUROLOGY

## 2024-07-25 PROCEDURE — 99205 OFFICE O/P NEW HI 60 MIN: CPT | Performed by: PSYCHIATRY & NEUROLOGY

## 2024-07-25 PROCEDURE — 99212 OFFICE O/P EST SF 10 MIN: CPT | Performed by: PSYCHIATRY & NEUROLOGY

## 2024-07-25 PROCEDURE — 3075F SYST BP GE 130 - 139MM HG: CPT | Performed by: PSYCHIATRY & NEUROLOGY

## 2024-07-25 ASSESSMENT — PATIENT HEALTH QUESTIONNAIRE - PHQ9: CLINICAL INTERPRETATION OF PHQ2 SCORE: 0

## 2024-09-18 ENCOUNTER — APPOINTMENT (OUTPATIENT)
Dept: RADIOLOGY | Facility: MEDICAL CENTER | Age: 69
End: 2024-09-18
Attending: PSYCHIATRY & NEUROLOGY
Payer: COMMERCIAL

## 2024-09-18 DIAGNOSIS — G35 RELAPSING REMITTING MULTIPLE SCLEROSIS (HCC): ICD-10-CM

## 2024-09-18 PROCEDURE — A9579 GAD-BASE MR CONTRAST NOS,1ML: HCPCS | Mod: JZ | Performed by: PSYCHIATRY & NEUROLOGY

## 2024-09-18 PROCEDURE — 700117 HCHG RX CONTRAST REV CODE 255: Mod: JZ | Performed by: PSYCHIATRY & NEUROLOGY

## 2024-09-18 PROCEDURE — 70553 MRI BRAIN STEM W/O & W/DYE: CPT

## 2024-09-18 RX ADMIN — GADOTERIDOL 20 ML: 279.3 INJECTION, SOLUTION INTRAVENOUS at 14:06

## 2025-01-22 ENCOUNTER — OFFICE VISIT (OUTPATIENT)
Dept: NEUROLOGY | Facility: MEDICAL CENTER | Age: 70
End: 2025-01-22
Attending: PSYCHIATRY & NEUROLOGY
Payer: COMMERCIAL

## 2025-01-22 VITALS
BODY MASS INDEX: 31.38 KG/M2 | DIASTOLIC BLOOD PRESSURE: 78 MMHG | RESPIRATION RATE: 12 BRPM | HEIGHT: 72 IN | SYSTOLIC BLOOD PRESSURE: 124 MMHG | WEIGHT: 231.7 LBS | HEART RATE: 59 BPM | OXYGEN SATURATION: 99 % | TEMPERATURE: 97.2 F

## 2025-01-22 DIAGNOSIS — G35 RELAPSING REMITTING MULTIPLE SCLEROSIS (HCC): ICD-10-CM

## 2025-01-22 DIAGNOSIS — G35 SECONDARY PROGRESSIVE MULTIPLE SCLEROSIS (HCC): Primary | ICD-10-CM

## 2025-01-22 PROCEDURE — 99212 OFFICE O/P EST SF 10 MIN: CPT | Performed by: PSYCHIATRY & NEUROLOGY

## 2025-01-22 PROCEDURE — 99214 OFFICE O/P EST MOD 30 MIN: CPT | Performed by: PSYCHIATRY & NEUROLOGY

## 2025-01-22 PROCEDURE — 3074F SYST BP LT 130 MM HG: CPT | Performed by: PSYCHIATRY & NEUROLOGY

## 2025-01-22 PROCEDURE — 3078F DIAST BP <80 MM HG: CPT | Performed by: PSYCHIATRY & NEUROLOGY

## 2025-01-22 ASSESSMENT — PATIENT HEALTH QUESTIONNAIRE - PHQ9: CLINICAL INTERPRETATION OF PHQ2 SCORE: 0

## 2025-01-22 NOTE — PROGRESS NOTES
"[facility] NEUROLOGY  MULTIPLE SCLEROSIS & NEUROIMMUNOLOGY  FOLLOW-UP VISIT    DISEASE SUMMARY:  Principal neurologic diagnosis: MS  Diagnosis of MS: 2010  Disease History:  - 2009: episode of left maisha-facial weakness, vertigo, and double vision; referred to Dr. Sweet; workup included MRI (which showed a brainstem lesion) and LP (reportedly consistent w/ MS); referred to Dr. Petty  - 4/2010: started Betaseron  - no additional attacks  - 9/13/2023: most recent back surgery (thoracic ?laminectomy)  - 6/8/2024: stopped Betaseron (no prescription)  Disease course at onset: RRMS  Current disease course: SPMS w/o activity w/o worsening  Previous disease therapies:  - Betaseron  Current disease therapies:  - none  Symptomatic therapies:  - baclofen: prescribed for muscle cramps, tapered off this successfully  - tadalafil:   CSF (9/3/2009):  - OCBs: \"positive\"  Other Testing:  -   MRI head:  - 9/18/2024: \"multiple abnormal subcortical and baljeet-ventricular T2 hyper-intensities... none of the lesions demonstrates contrast enhancement...\" (no comparison images available)  - 6/21/2023: \"no significant change...\"  - 3/8/2022: \"without new lesion...\"  - 12/21/2019: \"no significant change...\"  - 12/14/2017: \"stable... without new plaques or enhancing lesions...\"  - 6/5/2015: \"no new plaques or enhancing lesions...\"  - 5/19/2014: \"stable... without new or pathologic enhancement...\"  - 4/23/2013: \"stable... without new or enhancing lesions...\"  - 2/4/2010: \"... a single  new area of demyelinization is seen... minimal enhancement is seen surrounding hte new right posterior parietal lesion...\"  - 3/21/2011: \"unchanged... no new lesions or enhancing lesions...\"  - 6/23/2009:  - 10/20/2008:   MRI cervical spine:  - 6/20/2023: \"no white matter signal abnormality in the cervical cord...\"  - 7/1/2021: \"no definite abnormal cord signal...\"  - 12/27/2010: \"essentially stable... no cord signal abnormality on the current images and no " "enhancing lesion...\"  - 12/8/2009:   MRI thoracic spine:  - 6/17/2023: \"disc bulge with focal central protrusion at T8-9 with effacement of the ventral thecal sac and deformation of the spinal cord... mildly increased T2 signal at the [T8-9] level most consistent with myelomalacia\"  - 1/18/2022: \"\"  - 7/3/2012: \"no evidence of active demyelinating disease...\"  - 8/31/2011: \"no evidence of abnormal plaque formation or abnormal enhancement...\"  Immunizations:  - influenza?:   - Pneumonia?:  - SARS-CoV-2?:   Cancer Screens:  - mammogram:   - PAP?:   - skin check:     CC: SPMS w/o activity w/o worsening    INTERVAL HISTORY:  Randall Worrell is a 70 y.o. man with SPMS w/o activity w/o worsening and a history otherwise notable for T2DM.  I last saw him in the [facility] on 7/25/2024.  At that time I recommended he remain off Betaseron, and I ordered repeat MRI brain.  Today, he was unaccompanied, and he provided the following interval history:    Mr. Worrell continues to feel well off Betaseron.  He has not noticed any new or worsened neurologic symptoms since the last visit.    Overall, he feels quite well.  He does struggle with balance.  There have not been any falls.    MEDICATIONS:  Current Outpatient Medications   Medication Sig    aspirin 81 MG EC tablet Take 81 mg by mouth every day.    tadalafil (CIALIS) 5 MG tablet Take 5 mg by mouth every day.    lisinopril (PRINIVIL) 10 MG Tab Take 10 mg by mouth every day.    VITAMIN D PO Take 4,000 Units by mouth every day.    insulin lispro (HUMALOG KWIKPEN) 100 UNIT/ML Solution Pen-injector injection PEN Inject 4-18 Units under the skin 3 times a day with meals.    nadolol (CORGARD) 20 MG TABS Take 20 mg by mouth 2 times a day.    insulin glargine (LANTUS) 100 UNIT/ML Solution Inject 20 Units under the skin at bedtime.    levothyroxine (SYNTHROID) 100 MCG TABS Take 100 mcg by mouth every day. Indications: Underactive Thyroid     MEDICAL, SOCIAL, AND FAMILY " HISTORY:  There is no change in the patient's ROS or medical, social, or family histories since the previous visit on 7/25/2024.    REVIEW OF SYSTEMS:  A ROS was completed.  Pertinent positives and negatives were included in the HPI, above.  All other systems were reviewed and are negative.    PHYSICAL EXAM:  General/Medical:  - NAD    Neuro:  MENTAL STATUS: awake and alert; no deficits of speech or language; oriented to conversation; affect was appropriate to situation; pleasant, cooperative    CRANIAL NERVES:    II: acuity: NT, fields: NT, pupils: NT, discs: NT    III/IV/VI: versions: grossly intact    V: facial sensation: NT    VII: facial expression: symmetric    VIII: hearing: intact to voice    IX/X: palate: NT    XI: shoulder shrug: NT    XII: tongue: NT    MOTOR:  - bulk: NT  - tone: NT  Upper Extremity Strength (R/L)    NT   Elbow flexion NT   Elbow extension NT   Shoulder abduction NT     Lower Extremity Strength  (R/L)   Hip flexion NT   Knee extension NT   Knee flexion NT   Ankle dorsiflexion NT   Ankle plantarflexion NT     - heel-walk: NT  - toe-walk: NT  - pronator drift: absent  - abnormal movements: none    SENSATION:  - light touch: NT  - vibration (R/L, seconds): NT at the great toes  - pinprick: NT  - proprioception: NT  - Romberg: NT    COORDINATION:  - finger to nose: NT  - finger tapping: NT    REFLEXES:  Reflex Right Left   BR NT NT   Biceps NT NT   Triceps NT NT   Patellae NT NT   Achilles NT NT   Toes NT NT     GAIT:  - narrow base and normal  - tandem gait: side-stepped    QUANTITATIVE SCORES:  Timed 25-foot walk (sec): NT today (4.7 on 7/25/2024).  Assistive device: none    REVIEW OF IMAGING STUDIES:  I have summarized interval data above.    REVIEW OF LABORATORY STUDIES:  No additional data since the last visit.    ASSESSMENT:  Randall Worrell is a 70 y.o. man with SPMS w/o activity w/o worsening and a history otherwise notable for T2DM.  We reviewed his most recent MRI brain,  "and there were no enhancing lesions.  Plan to remain off immunotherapy since he remains clinically and (as far as I can tell) radiologically stable off treatment.    PLAN:  SPMS:  - remain off immunotherapy for now  - repeat MRI talya in ~1 year    Follow-Up:  - Return in about 1 year (around 1/22/2026).    Signed: Paolo Jordan M.D.    BILLING DOCUMENTATION:   I spent 31 minutes reviewing the medical record, interviewing and examining the patient, discussing my impression (see \"assessment\" above), and coordinating care.    "

## 2025-05-23 ENCOUNTER — PHARMACY VISIT (OUTPATIENT)
Dept: PHARMACY | Facility: MEDICAL CENTER | Age: 70
End: 2025-05-23
Payer: COMMERCIAL

## 2025-05-23 DIAGNOSIS — G35 SECONDARY PROGRESSIVE MULTIPLE SCLEROSIS (HCC): Primary | ICD-10-CM

## 2025-05-23 PROCEDURE — RXMED WILLOW AMBULATORY MEDICATION CHARGE: Performed by: INTERNAL MEDICINE

## (undated) DEVICE — ELECTRODE 850 FOAM ADHESIVE - HYDROGEL RADIOTRNSPRNT (50/PK)

## (undated) DEVICE — MIDAS LUBRICATOR DIFFUSER PACK (4EA/CA)

## (undated) DEVICE — BLADE SURGICAL CLIPPER - (50EA/CA)

## (undated) DEVICE — TOWEL STOP TIMEOUT SAFETY FLAG (40EA/CA)

## (undated) DEVICE — INTRAOP NEURO IN OR 1:1 PER 15 MIN

## (undated) DEVICE — ELECTRODE DUAL RETURN W/ CORD - (50/PK)

## (undated) DEVICE — GLOVE SZ 6.5 BIOGEL PI MICRO - PF LF (50PR/BX)

## (undated) DEVICE — TOOL DISSECT MATCH HEAD

## (undated) DEVICE — RESTRAINTS LIMB DISP. - (12/BX 4BX/CA)

## (undated) DEVICE — SPONGE PEANUT - (5/PK 50PK/CA)

## (undated) DEVICE — PACK JACKSON TABLE KIT W/OUT - HR (6EA/CA)

## (undated) DEVICE — BOVIE BLADE COATED &INSULATED (50EA/PK)

## (undated) DEVICE — SUCTION INSTRUMENT YANKAUER BULBOUS TIP W/O VENT (50EA/CA)

## (undated) DEVICE — DISSECT TOOL MIDAS REX

## (undated) DEVICE — TUBING CLEARLINK DUO-VENT - C-FLO (48EA/CA)

## (undated) DEVICE — SUTURE GENERAL

## (undated) DEVICE — GLOVE BIOGEL SZ 8 SURGICAL PF LTX - (50PR/BX 4BX/CA)

## (undated) DEVICE — KIT SURGIFLO W/OUT THROMBIN - (6EA/CA)

## (undated) DEVICE — DRILL BIT 14MM

## (undated) DEVICE — SET EXTENSION WITH 2 PORTS (48EA/CA) ***PART #2C8610 IS A SUBSTITUTE*****

## (undated) DEVICE — SLEEVE, VASO, THIGH, MED

## (undated) DEVICE — SYRINGE NON SAFETY 10 CC 20 GA X 1-1/2 IN (100/BX 4BX/CA)

## (undated) DEVICE — GLOVE BIOGEL INDICATOR SZ 6.5 SURGICAL PF LTX - (50PR/BX 4BX/CA)

## (undated) DEVICE — SCREW DISTRACTION 14MM YELLOW - STERILE (10EA/BX) (5TX4=20)

## (undated) DEVICE — SET LEADWIRE 5 LEAD BEDSIDE DISPOSABLE ECG (1SET OF 5/EA)

## (undated) DEVICE — HEAD HOLDER JUNIOR/ADULT

## (undated) DEVICE — PIN FIXATION

## (undated) DEVICE — STERI STRIP COMPOUND BENZOIN - TINCTURE 0.6ML WITH APPLICATOR (40EA/BX)

## (undated) DEVICE — HEADREST PRONEVIEW LARGE - (10/CA)

## (undated) DEVICE — LACTATED RINGERS INJ 1000 ML - (14EA/CA 60CA/PF)

## (undated) DEVICE — DRESSING TRANSPARENT FILM TEGADERM 4 X 4.75" (50EA/BX)"

## (undated) DEVICE — LACTATED RINGERS INJ. 500 ML - (24EA/CA)

## (undated) DEVICE — BOVIE BLADE COATED &INSULATED - 25/PK

## (undated) DEVICE — SURGIFOAM (SIZE 100) - (6EA/CA)

## (undated) DEVICE — TUBING C&T SET FLYING LEADS DRAIN TUBING (10EA/BX)

## (undated) DEVICE — NEEDLE SPINAL NON-SAFETY 18 GA X 3 IN (25EA/BX)

## (undated) DEVICE — SURGIFOAM (12X7) - (12EA/CA)

## (undated) DEVICE — GOWN WARMING STANDARD FLEX - (30/CA)

## (undated) DEVICE — ARMREST CRADLE FOAM - (2PR/PK 12PR/CA)

## (undated) DEVICE — HEMOSTAT SURG ABSORBABLE - 2 X 3 IN SURGICEL (24EA/CA)

## (undated) DEVICE — SUTURE 0 SILK TIES (36PK/BX)

## (undated) DEVICE — CANISTER SUCTION 3000ML MECHANICAL FILTER AUTO SHUTOFF MEDI-VAC NONSTERILE LF DISP  (40EA/CA)

## (undated) DEVICE — GOWN SURGEONS LARGE - (32/CA)

## (undated) DEVICE — SHEET PEDIATRIC LAPAROTOMY - (10/CA)

## (undated) DEVICE — SODIUM CHL IRRIGATION 0.9% 1000ML (12EA/CA)

## (undated) DEVICE — HEMOSTAT SURG ABSORBABLE - 4 X 8 IN SURGICEL (24EA/CA)

## (undated) DEVICE — PACK NEURO - (2EA/CA)

## (undated) DEVICE — CHLORAPREP 26 ML APPLICATOR - ORANGE TINT(25/CA)

## (undated) DEVICE — SPONGE GAUZESTER 4 X 4 4PLY - (128PK/CA)

## (undated) DEVICE — KIT EVACUATER 3 SPRING PVC LF 1/8 DRAIN SIZE (10EA/CA)"

## (undated) DEVICE — NEPTUNE 4 PORT MANIFOLD - (20/PK)

## (undated) DEVICE — SENSOR SPO2 NEO LNCS ADHESIVE (20/BX) SEE USER NOTES

## (undated) DEVICE — DRAPE LAPAROTOMY T SHEET - (12EA/CA)

## (undated) DEVICE — KIT ANESTHESIA W/CIRCUIT & 3/LT BAG W/FILTER (20EA/CA)

## (undated) DEVICE — GLOVE BIOGEL PI INDICATOR SZ 8.0 SURGICAL PF LF -(50/BX 4BX/CA)

## (undated) DEVICE — SUTURE 2-0 VICRYL PLUS CT-1 - 8 X 18 INCH(12/BX)

## (undated) DEVICE — SET EPIDURAL BURRON NON-SAFETY (12EA/CA)

## (undated) DEVICE — CLOSURE SKIN STRIP 1/2 X 4 IN - (STERI STRIP) (50/BX 4BX/CA)

## (undated) DEVICE — MASK ANESTHESIA ADULT  - (100/CA)

## (undated) DEVICE — PROTECTOR ULNA NERVE - (36PR/CA)

## (undated) DEVICE — SUTURE 0 VICRYL PLUS CT-1 - 8 X 18 INCH (12/BX)

## (undated) DEVICE — TUBE EMG NIM TRIVANTAGE 7MM (3EA/PK)